# Patient Record
Sex: FEMALE | Race: WHITE | NOT HISPANIC OR LATINO | Employment: UNEMPLOYED | URBAN - NONMETROPOLITAN AREA
[De-identification: names, ages, dates, MRNs, and addresses within clinical notes are randomized per-mention and may not be internally consistent; named-entity substitution may affect disease eponyms.]

---

## 2017-09-28 ENCOUNTER — AMBULATORY - GICH (OUTPATIENT)
Dept: RADIOLOGY | Facility: OTHER | Age: 47
End: 2017-09-28

## 2017-09-28 DIAGNOSIS — G89.29 OTHER CHRONIC PAIN: ICD-10-CM

## 2017-09-28 DIAGNOSIS — R32 URINARY INCONTINENCE: ICD-10-CM

## 2017-09-28 DIAGNOSIS — M54.42 LOW BACK PAIN WITH LEFT-SIDED SCIATICA: ICD-10-CM

## 2017-09-28 DIAGNOSIS — M79.2 NEURALGIA AND NEURITIS, UNSPECIFIED (CODE): ICD-10-CM

## 2017-10-03 ENCOUNTER — HOSPITAL ENCOUNTER (OUTPATIENT)
Dept: RADIOLOGY | Facility: OTHER | Age: 47
End: 2017-10-03

## 2017-10-03 DIAGNOSIS — M79.2 NEURALGIA AND NEURITIS, UNSPECIFIED (CODE): ICD-10-CM

## 2017-10-03 DIAGNOSIS — G89.29 OTHER CHRONIC PAIN: ICD-10-CM

## 2017-10-03 DIAGNOSIS — M54.42 LOW BACK PAIN WITH LEFT-SIDED SCIATICA: ICD-10-CM

## 2017-10-03 DIAGNOSIS — R32 URINARY INCONTINENCE: ICD-10-CM

## 2017-10-11 ENCOUNTER — AMBULATORY - GICH (OUTPATIENT)
Dept: SCHEDULING | Facility: OTHER | Age: 47
End: 2017-10-11

## 2017-10-12 ENCOUNTER — AMBULATORY - GICH (OUTPATIENT)
Dept: ORTHOPEDICS | Facility: OTHER | Age: 47
End: 2017-10-12

## 2017-10-12 DIAGNOSIS — M25.531 PAIN IN RIGHT WRIST: ICD-10-CM

## 2017-10-12 DIAGNOSIS — M25.532 PAIN IN LEFT WRIST: ICD-10-CM

## 2017-10-16 ENCOUNTER — AMBULATORY - GICH (OUTPATIENT)
Dept: ORTHOPEDICS | Facility: OTHER | Age: 47
End: 2017-10-16

## 2017-10-16 ENCOUNTER — HISTORY (OUTPATIENT)
Dept: ORTHOPEDICS | Facility: OTHER | Age: 47
End: 2017-10-16

## 2017-10-16 ENCOUNTER — HOSPITAL ENCOUNTER (OUTPATIENT)
Dept: RADIOLOGY | Facility: OTHER | Age: 47
End: 2017-10-16
Attending: ORTHOPAEDIC SURGERY

## 2017-10-16 ENCOUNTER — OFFICE VISIT - GICH (OUTPATIENT)
Dept: ORTHOPEDICS | Facility: OTHER | Age: 47
End: 2017-10-16

## 2017-10-16 DIAGNOSIS — M25.531 PAIN IN RIGHT WRIST: ICD-10-CM

## 2017-10-16 DIAGNOSIS — M25.532 PAIN IN LEFT WRIST: ICD-10-CM

## 2017-10-16 DIAGNOSIS — G56.03 BILATERAL CARPAL TUNNEL SYNDROME: ICD-10-CM

## 2017-10-23 ENCOUNTER — OFFICE VISIT - GICH (OUTPATIENT)
Dept: FAMILY MEDICINE | Facility: OTHER | Age: 47
End: 2017-10-23

## 2017-10-23 ENCOUNTER — HISTORY (OUTPATIENT)
Dept: FAMILY MEDICINE | Facility: OTHER | Age: 47
End: 2017-10-23

## 2017-10-23 DIAGNOSIS — G56.03 BILATERAL CARPAL TUNNEL SYNDROME: ICD-10-CM

## 2017-10-23 DIAGNOSIS — H61.21 IMPACTED CERUMEN OF RIGHT EAR: ICD-10-CM

## 2017-10-23 DIAGNOSIS — E78.49 OTHER HYPERLIPIDEMIA: ICD-10-CM

## 2017-10-23 DIAGNOSIS — Z01.818 ENCOUNTER FOR OTHER PREPROCEDURAL EXAMINATION: ICD-10-CM

## 2017-10-23 LAB
ABSOLUTE BASOPHILS - HISTORICAL: 0.1 THOU/CU MM
ABSOLUTE EOSINOPHILS - HISTORICAL: 0.2 THOU/CU MM
ABSOLUTE IMMATURE GRANULOCYTES(METAS,MYELOS,PROS) - HISTORICAL: 0.1 THOU/CU MM
ABSOLUTE LYMPHOCYTES - HISTORICAL: 2.5 THOU/CU MM (ref 0.9–2.9)
ABSOLUTE MONOCYTES - HISTORICAL: 0.6 THOU/CU MM
ABSOLUTE NEUTROPHILS - HISTORICAL: 8.8 THOU/CU MM (ref 1.7–7)
ANION GAP - HISTORICAL: 8 (ref 5–18)
BASOPHILS # BLD AUTO: 0.9 %
BUN SERPL-MCNC: 11 MG/DL (ref 7–25)
BUN/CREAT RATIO - HISTORICAL: 12
CALCIUM SERPL-MCNC: 9.8 MG/DL (ref 8.6–10.3)
CHLORIDE SERPLBLD-SCNC: 103 MMOL/L (ref 98–107)
CO2 SERPL-SCNC: 23 MMOL/L (ref 21–31)
CREAT SERPL-MCNC: 0.9 MG/DL (ref 0.7–1.3)
EOSINOPHIL NFR BLD AUTO: 1.9 %
ERYTHROCYTE [DISTWIDTH] IN BLOOD BY AUTOMATED COUNT: 12.9 % (ref 11.5–15.5)
GFR IF NOT AFRICAN AMERICAN - HISTORICAL: >60 ML/MIN/1.73M2
GLUCOSE SERPL-MCNC: 83 MG/DL (ref 70–105)
HCT VFR BLD AUTO: 39.3 % (ref 33–51)
HEMOGLOBIN: 13.5 G/DL (ref 12–16)
IMMATURE GRANULOCYTES(METAS,MYELOS,PROS) - HISTORICAL: 0.5 %
LYMPHOCYTES NFR BLD AUTO: 20.3 % (ref 20–44)
MCH RBC QN AUTO: 30.3 PG (ref 26–34)
MCHC RBC AUTO-ENTMCNC: 34.4 G/DL (ref 32–36)
MCV RBC AUTO: 88 FL (ref 80–100)
MONOCYTES NFR BLD AUTO: 4.7 %
NEUTROPHILS NFR BLD AUTO: 71.7 % (ref 42–72)
PLATELET # BLD AUTO: 292 THOU/CU MM (ref 140–440)
PMV BLD: 9.4 FL (ref 6.5–11)
POTASSIUM SERPL-SCNC: 4 MMOL/L (ref 3.5–5.1)
RED BLOOD COUNT - HISTORICAL: 4.45 MIL/CU MM (ref 4–5.2)
SODIUM SERPL-SCNC: 134 MMOL/L (ref 133–143)
WHITE BLOOD COUNT - HISTORICAL: 12.3 THOU/CU MM (ref 4.5–11)

## 2017-10-31 ENCOUNTER — COMMUNICATION - GICH (OUTPATIENT)
Dept: CARDIAC REHAB | Facility: OTHER | Age: 47
End: 2017-10-31

## 2017-11-08 ENCOUNTER — HISTORY (OUTPATIENT)
Dept: SURGERY | Facility: OTHER | Age: 47
End: 2017-11-08

## 2017-11-08 ENCOUNTER — SURGERY (OUTPATIENT)
Dept: SURGERY | Facility: OTHER | Age: 47
End: 2017-11-08

## 2017-11-08 ENCOUNTER — HOSPITAL ENCOUNTER (OUTPATIENT)
Dept: SURGERY | Facility: OTHER | Age: 47
Discharge: HOME | End: 2017-11-08
Attending: ORTHOPAEDIC SURGERY | Admitting: ORTHOPAEDIC SURGERY

## 2017-11-08 DIAGNOSIS — Z98.890 OTHER SPECIFIED POSTPROCEDURAL STATES: ICD-10-CM

## 2017-11-09 ENCOUNTER — COMMUNICATION - GICH (OUTPATIENT)
Dept: ORTHOPEDICS | Facility: OTHER | Age: 47
End: 2017-11-09

## 2017-11-20 ENCOUNTER — HISTORY (OUTPATIENT)
Dept: ORTHOPEDICS | Facility: OTHER | Age: 47
End: 2017-11-20

## 2017-11-20 ENCOUNTER — OFFICE VISIT - GICH (OUTPATIENT)
Dept: ORTHOPEDICS | Facility: OTHER | Age: 47
End: 2017-11-20

## 2017-11-20 DIAGNOSIS — Z98.890 OTHER SPECIFIED POSTPROCEDURAL STATES: ICD-10-CM

## 2017-11-27 ENCOUNTER — OFFICE VISIT - GICH (OUTPATIENT)
Dept: FAMILY MEDICINE | Facility: OTHER | Age: 47
End: 2017-11-27

## 2017-11-27 ENCOUNTER — HISTORY (OUTPATIENT)
Dept: FAMILY MEDICINE | Facility: OTHER | Age: 47
End: 2017-11-27

## 2017-11-27 DIAGNOSIS — M79.7 FIBROMYALGIA: ICD-10-CM

## 2017-11-27 DIAGNOSIS — G56.03 BILATERAL CARPAL TUNNEL SYNDROME: ICD-10-CM

## 2017-11-27 DIAGNOSIS — E78.49 OTHER HYPERLIPIDEMIA: ICD-10-CM

## 2017-11-27 DIAGNOSIS — Z98.890 OTHER SPECIFIED POSTPROCEDURAL STATES: ICD-10-CM

## 2017-11-27 DIAGNOSIS — M54.50 LOW BACK PAIN: ICD-10-CM

## 2017-11-27 DIAGNOSIS — M62.838 OTHER MUSCLE SPASM: ICD-10-CM

## 2017-11-27 DIAGNOSIS — G89.29 OTHER CHRONIC PAIN: ICD-10-CM

## 2017-11-27 ASSESSMENT — PATIENT HEALTH QUESTIONNAIRE - PHQ9: SUM OF ALL RESPONSES TO PHQ QUESTIONS 1-9: 7

## 2017-11-28 ENCOUNTER — COMMUNICATION - GICH (OUTPATIENT)
Dept: FAMILY MEDICINE | Facility: OTHER | Age: 47
End: 2017-11-28

## 2017-11-29 ENCOUNTER — AMBULATORY - GICH (OUTPATIENT)
Dept: SCHEDULING | Facility: OTHER | Age: 47
End: 2017-11-29

## 2017-11-30 ENCOUNTER — AMBULATORY - GICH (OUTPATIENT)
Dept: SCHEDULING | Facility: OTHER | Age: 47
End: 2017-11-30

## 2017-12-01 LAB
6-MONOACETYL MORPHINE - HISTORICAL: ABNORMAL NG/ML
AMPHETAMINE URINE - HISTORICAL: ABNORMAL NG/ML
BARBITURATE URINE - HISTORICAL: ABNORMAL NG/ML
BENZODIAZEPINE URINE - HISTORICAL: ABNORMAL NG/ML
BUPRENORPHRINE URINE - HISTORICAL: ABNORMAL NG/ML
COCAINE METAB URINE - HISTORICAL: ABNORMAL NG/ML
CREAT UR - HISTORICAL: 120 MG/DL
ETHYLGLUCURONIDE URINE - HISTORICAL: ABNORMAL NG/ML
FENTANYL URINE - HISTORICAL: ABNORMAL NG/ML
MASS SPECTROMETRY URINE - HISTORICAL: ABNORMAL
METHADONE URINE - HISTORICAL: ABNORMAL NG/ML
OPIATES URINE - HISTORICAL: ABNORMAL NG/ML
OXYCODONE URINE - HISTORICAL: ABNORMAL NG/ML
PH URINE - HISTORICAL: 5.9
PROPOXYPHENE URINE - HISTORICAL: ABNORMAL NG/ML
THC 50 URINE - HISTORICAL: ABNORMAL NG/ML
TRAMADOL - HISTORICAL: ABNORMAL NG/ML

## 2017-12-26 ENCOUNTER — COMMUNICATION - GICH (OUTPATIENT)
Dept: FAMILY MEDICINE | Facility: OTHER | Age: 47
End: 2017-12-26

## 2017-12-26 DIAGNOSIS — M79.7 FIBROMYALGIA: ICD-10-CM

## 2017-12-27 ENCOUNTER — OFFICE VISIT - GICH (OUTPATIENT)
Dept: FAMILY MEDICINE | Facility: OTHER | Age: 47
End: 2017-12-27

## 2017-12-27 ENCOUNTER — HISTORY (OUTPATIENT)
Dept: FAMILY MEDICINE | Facility: OTHER | Age: 47
End: 2017-12-27

## 2017-12-27 DIAGNOSIS — E78.49 OTHER HYPERLIPIDEMIA: ICD-10-CM

## 2017-12-27 DIAGNOSIS — M54.50 LOW BACK PAIN: ICD-10-CM

## 2017-12-27 DIAGNOSIS — M19.90 OSTEOARTHRITIS: ICD-10-CM

## 2017-12-27 DIAGNOSIS — G89.29 OTHER CHRONIC PAIN: ICD-10-CM

## 2017-12-27 DIAGNOSIS — M79.7 FIBROMYALGIA: ICD-10-CM

## 2017-12-27 LAB
CHOL/HDL RATIO - HISTORICAL: 4.38
CHOLESTEROL TOTAL: 171 MG/DL
HDLC SERPL-MCNC: 39 MG/DL (ref 23–92)
LDLC SERPL CALC-MCNC: 54 MG/DL
NON-HDL CHOLESTEROL - HISTORICAL: 132 MG/DL
PROVIDER ORDERDED STATUS - HISTORICAL: ABNORMAL
TRIGL SERPL-MCNC: 388 MG/DL

## 2017-12-27 NOTE — PROGRESS NOTES
Patient Information     Patient Name MRFabiana Herbert [id number] Female 1970      Progress Notes by Gladys Jones NP at 10/23/2017 10:00 AM     Author:  Gladys Jones NP Service:  (none) Author Type:  PHYS- Nurse Practitioner     Filed:  10/23/2017 11:25 AM Encounter Date:  10/23/2017 Status:  Signed     :  Gladys Jones NP (PHYS- Nurse Practitioner)            See H&P. GLADYS JONES NP ....................  10/23/2017   10:08 AM

## 2017-12-27 NOTE — PROGRESS NOTES
Patient Information     Patient Name MRN Sex Fabiana Vieira [id number] Female 1970      Progress Notes by Brittany Ferrell at 10/3/2017 10:53 AM     Author:  Brittany Ferrell Service:  (none) Author Type:  Other Clinical Staff     Filed:  10/3/2017 10:53 AM Date of Service:  10/3/2017 10:53 AM Status:  Signed     :  Brittany Ferrell (Other Clinical Staff)            Falls Risk Criteria:    Age 65 and older or under age 4        Sensory deficits    Poor vision    Use of ambulatory aides    Impaired judgment    Unable to walk independently    Meets High Risk criteria for falls:  no

## 2017-12-28 NOTE — TELEPHONE ENCOUNTER
Patient Information     Patient Name MRN Fabiana Preston [id number] Female 1970      Telephone Encounter by Zaira Negron at 2017 11:51 AM     Author:  Zaira Negron Service:  (none) Author Type:  (none)     Filed:  2017 11:52 AM Encounter Date:  2017 Status:  Signed     :  Zaira Negron            TDE - Pt returned TDE's call to state that everything is going well.  If there are additional concerns please call pt otherwise she stated she is doing good.        Zaira EDWARDS ....................  2017   11:52 AM

## 2017-12-28 NOTE — OR ANESTHESIA
Patient Information     Patient Name MRN Sex     Fabiana Sanders [id number] Female 1970      OR Anesthesia by Brent Simpson DO at 2017  1:00 PM     Author:  Brent Simpson DO Service:  (none) Author Type:  PHYS- Anesthesiologist     Filed:  2017  1:00 PM Date of Service:  2017  1:00 PM Status:  Signed     :  Brent Simpson DO (PHYS- Anesthesiologist)            Anesthesia Post Operative Care Note    Name: Fabiana Sanders  MRN:   [id number]  :    1970       Procedure Done:  See Surgeon Note        Anesthesia Technique    Anesthetic Type:  General     Airway Management:  LMA     Oral Trauma:  No    Intraoperative Course   Hemodynamics:  Stable    Ventilation Normal:  Yes Lung Sounds:  Normal      PACU Course    Airway Status:  Extubated     Nondepolarizer Used:       Reversed: N/A   Hemodynamics:  Stable      Hydration: Euvolemic   Temperature:  36.1 - 38.3      Mental Status:  Awake, alert, follows commands   Pain Management:  Adequate   Regional Block:  No   Anesthesia Complications:  None      Vital Signs:  Temp: 97.6  F (36.4  C)  Pulse: (!) 111  BP: 123/82  Resp: 18  SpO2: 94 %    O2 Device: Room Air                  Active Lines:  Patient Lines/Drains/Airways Status    Active Line     None                Intake & Output:  Date  17 - 17 0659(Not Admitted)    17 - 17 0659(Discharged)      Shift  1598-2951 5083-8913 4937-5936 24 Hour Total 0564-0282 7911-8575 4316-1050 24 Hour Total   I  N  T  A  K  E   Oral/Enteral     240   240       +I/O+    Oral     240   240    Intravenous     850   850       +I/O+  Maint IV (lactated Ringers infusion)     850   850    Shift Total     1090   1090   O  U  T  P  U  T   Shift Total           NET      1090   1090   Weight (kg)                  Labs:  No results for input(s): YS6LJJDNQTZ, GBR6PZUOMSLK, PHARTERIAL, UFJ6KNYKSQWW, H0VYMIODLWON in the last 24 hours.    No results for input(s): MAGNESIUM in  the last 24 hours.    No results for input(s): GLUCOSEMETER in the last 720 hours.        Brent Simpson DO ....................  11/8/2017   1:00 PM

## 2017-12-28 NOTE — PROGRESS NOTES
Patient Information     Patient Name MRN Sex Fabiana Vieira [id number] Female 1970      Progress Notes by Radha Humphrey MD at 2017  8:15 AM     Author:  Radha Humphrey MD Service:  (none) Author Type:  Physician     Filed:  2017  4:48 PM Encounter Date:  2017 Status:  Signed     :  Radha Humphrey MD (Physician)            SUBJECTIVE:    Fabiana Sanders is a 47 y.o. female who presents to [facility].     HPI Comments: Patient was previously going to [facility].   She moved here from [place] about 5 years ago.   She has a previous diagnosis of fibromyalgia, chronic lower back pain and osteoarthritis.   She is currently on lortab and tramadol.  Her PCP recently moved and she was finding it hard to travel to [place] from  (previouly had lived in [place]).     Recently she underwent 4 weeks of physical therapy (in October) at [facility] and this was helpful.   Recent diagnosed of severe B Carpal tunnel, s/p R CTS release.     Her  has end stage COPD and she is the primary care taker.         Patient Active Problem List      Diagnosis Date Noted     Fibromyalgia 2017     Chronic midline low back pain without sciatica 2017     S/P right carpal tunnel release 2017     Other hyperlipidemia 10/23/2017     Bilateral carpal tunnel syndrome 10/16/2017       REVIEW OF SYSTEMS:  ROS see HPI, ROS is otherwise negative.     OBJECTIVE:  /66  Pulse 64  Wt 104.3 kg (230 lb)  BMI 39.48 kg/m2    EXAM:   Physical Exam   Constitutional: She is oriented to person, place, and time and well-developed, well-nourished, and in no distress.   Eyes: Conjunctivae are normal.   Cardiovascular: Normal rate.    Pulmonary/Chest: Effort normal.   Neurological: She is alert and oriented to person, place, and time.   Skin: No rash noted.   Psychiatric: Mood and affect normal.       ASSESSMENT/PLAN:    ICD-10-CM    1. Muscle spasm M62.838    2. Fibromyalgia M79.7 traZODone  (DESYREL) 50 mg tablet      cyclobenzaprine (FLEXERIL) 10 mg tablet      venlafaxine (EFFEXOR XR) 75 mg cp24 Extended-Release capsule      atorvastatin (LIPITOR) 20 mg tablet      DRUG SCREEN - PAIN MANAGEMENT - TOXASSURE      traMADol (ULTRAM) 50 mg tablet      AMB CONSULT TO PHYSICAL THERAPY      DRUG SCREEN - PAIN MANAGEMENT - TOXASSURE   3. Chronic midline low back pain without sciatica M54.5 AMB CONSULT TO PHYSICAL THERAPY     G89.29    4. S/P right carpal tunnel release Z98.890    5. Bilateral carpal tunnel syndrome G56.03    6. Other hyperlipidemia E78.4         Plan:    Patient Instructions   Will fill Tramadol 2 tabs 3 x/day.  Trial of water based therapy at [facility], consider ongoing water based exercise.   Will request records from [facility]  Continue with home exercise program.  Urine tox screen, per policy.  Follow up in 1 month to review records, review controlled substance contract and further discuss pain management.        Radha Humphrey MD

## 2017-12-28 NOTE — PROGRESS NOTES
Patient Information     Patient Name MRN Sex Fabiana Vieira [id number] Female 1970      Progress Notes by Getachew Das DO at 10/16/2017  8:45 AM     Author:  Getachew Das DO Service:  (none) Author Type:  Physician     Filed:  10/16/2017  9:15 AM Encounter Date:  10/16/2017 Status:  Signed     :  Getachew Das DO (Physician)            Fabiana Sanders was seen in consultation for Nayeli Olivia CNP for a chief complaint of numbness into both hands.      CHIEF COMPLAINT: Fabiana Sanders is a 47 y.o.  female  Chief Complaint     Patient presents with       Consult      Bilateral CTS       HISTORY OF PRESENTING INJURY   History of presenting injury, patient has had an ongoing complaint of numbness into both hands since . Her right is worse than her left. She denies any trauma but has noticed that she is getting more numbness. She's been wearing splints this tried anti-inflammatories and did do some therapy. There is been no resolution or symptoms. She comes in today to discuss options and has underwent an EMG.  Description of pain:  mild aching, discomfort and numbness   Radiation of pain: yes  Pain course: gradually worsening  Worse with: bending or flexing affected area, extending affected area and turning or rotating affected area  Improved by: OTC meds, Rx meds and rest  History of injection: No  Any PT: Yes    PAST MEDICAL HISTORY:  Past Medical History:     Diagnosis  Date     Bilateral carpal tunnel syndrome 10/16/2017       PAST SURGICAL HISTORY:  Hysterectomy, hernia repair.    ORTHOPEDIC FRACTURES AND BROKEN BONES:  No previous fractures.    ALLERGIES:  No Known Allergies    CURRENT MEDICATIONS:  Current Outpatient Prescriptions       Medication  Sig Dispense Refill     atorvastatin (LIPITOR) 20 mg tablet Take 20 mg by mouth once daily.       cyclobenzaprine (FLEXERIL) 10 mg tablet Take 1 tablet by mouth 3 times daily if needed for Muscle Spasm.        "diclofenac 1 % topical (VOLTAREN) gel Apply 4 g topically to affected area(s) 4 times daily.       meloxicam (MOBIC) 15 mg tablet 15 mg, 1 tab BID PRN       traMADol (ULTRAM) 50 mg tablet Take 2 tablets by mouth 2 times daily.       traZODone (DESYREL) 50 mg tablet 50 mg 1-2 tabs at HS       venlafaxine (EFFEXOR XR) 75 mg cp24 Extended-Release capsule Take 150 mg by mouth once daily with a meal.       No current facility-administered medications for this visit.      Medications have been reviewed by me and are current to the best of my knowledge and ability.      SOCIAL HISTORY:  Marital Status:   Children: Yes  Occupation: Presently not working. 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  Alcohol use:  No  Tobacco use: Smoker: yes  Are you or have you used illicit drugs:  no    FAMILY HISTORY:  Father passed away mom is still alive history of throat, lung and brain cancer and alcoholism.    REVIEW OF SYSTEMS:  The review of systems as documented in the HPI and on the intake questionnaire, completed by the patient on 10/16/2017 have been reviewed by myself and the pertinent positives and negatives addressed.  The remainder of the complete review of systems was non-contributory.    PHYSICAL EXAM:   /88  Pulse (!) 110  Ht 1.626 m (5' 4\")  Wt 96.6 kg (213 lb)  BMI 36.56 kg/m2 Body mass index is 36.56 kg/(m^2).    General Appearance: Pleasant female in good appearance, mood and affect.  Alert and orientated times three ( time, date and location).    Skin:Normal    Sensation is Abnormal in the median nerve distribution bilaterally.    Shoulder:  Motion: full    Elbow:  Flexion: Normal  Extension: Normal    Wrist:  Flexion: Normal  Extension: Normal  Radial/ulnar motion: Normal  Tenderness at the scaphoid: negative    Hand:  Thenar wasting: no  Hypothenar wasting: no  Sensation: Abnormal  Radial and ulnar blood flow:  Normal  Tinel's test positive  Phalen's test positive  Compression test positive    Eyes: Pupils are " round.    Ears: Hearing: Intact    Heart: Good capillary refill into both hands. Radial pulses regular.    Lungs: Coarse breath sounds.     Radiographic images from 10/16 where independently reviewed and discussed with the patient.      Xray:     X-rays demonstrate maintained joint spaces. Very minimal osteoarthritic changes appreciated. No fractures noted.    EMG:     Results reviewed with the patient they show delayed times in the median nerve bilaterally greater than left. Motor on the right is 5.1 the left is 4.6 ms sensory is 3.3 delayed time on the right and 3.0 on the left.    IMPRESSION:  Bilateral carpal tunnel syndrome    PLAN:  Risks, benefits, conservative, surgical and alternatives to treatment where discussed and the patient would like to proceed with operative intervention.  Patient like to do her right carpal tunnel first followed by the left.  She was given a handout and we did review the posterior board to help her better understand carpal tunnel symptoms and her pathology as well as anatomy she verbalized an understanding. Handout was given.  She will need preoperative clearance.  She will follow up after above.  Questions and concerns answered.    I have discussed options with Fabiana Sanders the treatment for carpal tunnel, which have included observation, physical therapy, corticosteroid injection versus surgical release. I discussed pros and cons of each approach, and at this point, Fabiana Sanders would like to proceed with carpal tunnel surgery. We discussed that surgery would be an outpatient surgery, you would be able to go home following the surgery. We will plan on open release of the transverse carpal ligament with anything else that needs to be done.  Surgical anesthesia would be general anesthesia versus block and anesthesia will discuss options.  I discussed following surgery Fabiana Sanders would be in a splint until seen in the office and they can work on gentle motion of the  elbow and fingers after surgery.  At four weeks following surgery occupational therapy may be needed.   Full recovery from carpal tunnel surgery may take a year. The goal will be pain relief. Complications were discussed including continued pain, stiffness in the wrist, rare chance of neurovascular damage, potential chance of infection. If deep infection were to occur, further surgery may be needed with repeat washout in the operating room with possible need for treatment with antibiotics.    Risks, benefits, conservative, surgical, and alternatives of treatment were thoroughly outlined. No guarantees were given. Risks which do include, but are not limited to:  Scar, infection, decreased motion, damage to blood vessels, nerves and tendons, failure or need for further treatment, reaction to medications and anesthesia, blood clots, and the possibility of death where discussed.  She did verbalize an understanding. All questions and concerns were addressed.    Patient is set up for open right carpal tunnel surgery    Fabiana Sanders will need pre op clearance for management of pulmonary function due to tobacco abuse.    Follow up after surgery will be 12-14 days    All questions where answered to the patients satisfaction.    Getachew Das D.O.  Orthopaedic Surgeon    [address] [address]  Phone [phone] ([phone])  Fax [phone]    This document was created using computer generated templates and voice activated software.    9:09 AM 10/16/2017

## 2017-12-28 NOTE — INTERVAL H&P NOTE
Patient Information     Patient Name MRN Sex Fabiana Vieira [id number] Female 1970      Interval H&P Note by Getachew Das DO at 2017  6:40 AM     Author:  Getachew Das DO Service:  (none) Author Type:  Physician     Filed:  2017  6:40 AM Date of Service:  2017  6:40 AM Status:  Signed     :  Getachew Das DO (Physician)            History and Physical Update    The history and physical has been reviewed and the patient's right wrist has been examined.  There are no interim changes to the patient's history or physical condition.  She is ready to proceed with planned procedure.  She understands the risks and benefits and once again these where outlined.    Getachew Das DO  Orthopedic Surgeon        Source Note     Author:  Gladys Jones NP Service:  (none) Author Type:  PHYS- Nurse Practitioner    Filed:  10/23/2017 11:25 AM Date of Service:  10/23/2017 10:00 AM Status:  Signed    :  Gladys Jones NP (PHYS- Nurse Practitioner)              ----------------- PREOPERATIVE EXAM ------------------  10/23/2017    SUBJECTIVE:  Fabiana Sanders is a 47 y.o. female here for preoperative optimization.    Nursing Notes:   Phylicia Jones  10/23/2017 10:09 AM  Signed  Date of Surgery: 17   Type of Surgery: Right carpel tunnel release  Surgeon: Dr. Das  Hospital:  [facility]  Fax: N/A    Fever/Chills or other infectious symptoms in past month: NO  >10lb weight loss in past two months: NO  O2 SAT: 100    Health Care Directive/Code status:  NO; CPR  Hx of blood transfusions:   (NO)   Td up to date:  YES  History of VRE/MRSA:  (NO) Date: NA    Preoperative Evaluation: Obstructive Sleep Apnea screening    S: Snore -  Do you snore loudly? (louder than talking or loud enough to be heard through closed doors)(NO)  T: Tired - Do you often feel tired, fatigued, or sleepy during the daytime?(YES)  O: Observed - Has anyone ever  "observed you stop breathing during your sleep?(NO)  P: Pressure - Do you have or are you being treated for high blood pressure?(YES)  B: BMI - BMI greater than 35kg/m2?(YES)  A: Age - Age over 50 years old?(NO)  N: Neck - Neck circumference greater than 40 cm?(YES)  G: Gender - Gender: Male?(NO)    Total number of \"YES\" responses:  4    Scoring: Low risk of VALENTIN 0-2  At Risk of VALENTIN: >3 High Risk of VALENTIN: 5-8    Phylicia Jones LPN...................  10/23/2017   10:01 AM        Phylicia Jones  10/23/2017 11:02 AM  Signed  The right ear canal was irrigated with body-temperature tap water with the jet of water directed superiorly.  The right ear canal was then re-examined and cleared of the impaction.  The patient tolerated the procedure well.  Phylicia Jones LPN..............10/23/2017 11:02 AM      I was asked to see Fabiana Sanders by Dr. Das for a preoperative optimization due to hyperlipidemia, chronic back pain and osteoarthritis. She had a pain contract with Dr Chavez in [place]. She was taking hydrocodone-APAP 10/325 mg but does not have the pain medications currently. She does not have a PCP currently.       Patient Active Problem List      Diagnosis Date Noted     Other hyperlipidemia 10/23/2017     Bilateral carpal tunnel syndrome 10/16/2017       Past Medical History:     Diagnosis  Date     Bilateral carpal tunnel syndrome 10/16/2017       Past Surgical History:      Procedure  Laterality Date     INGUINAL HERNIA REPAIR  2007     PARTIAL HYSTERECTOMY  2009    has both ovaries       TUBAL LIGATION  2001       Current Outpatient Prescriptions       Medication  Sig Dispense Refill     atorvastatin (LIPITOR) 20 mg tablet Take 20 mg by mouth once daily.       cyclobenzaprine (FLEXERIL) 10 mg tablet Take 1 tablet by mouth 3 times daily if needed for Muscle Spasm.       diclofenac 1 % topical (VOLTAREN) gel Apply 4 g topically to affected area(s) 4 times daily.       meloxicam (MOBIC) 15 mg tablet 15 mg, 1 tab " "BID PRN       traMADol (ULTRAM) 50 mg tablet Take 2 tablets by mouth 2 times daily.       traZODone (DESYREL) 50 mg tablet 50 mg 1-2 tabs at HS       venlafaxine (EFFEXOR XR) 75 mg cp24 Extended-Release capsule Take 150 mg by mouth once daily with a meal.       No current facility-administered medications for this visit.      Medications have been reviewed by me and are current to the best of my knowledge and ability.    Recent use of: NSAIDS    Allergies:  No Known Allergies    Latex allergy  no    Family History       Problem   Relation Age of Onset     Throat cancer  Father      Other  Father      brain tumor       Cancer  Maternal Grandmother      \"female organs\"       Stroke  Paternal Grandmother      Throat cancer  Paternal Grandfather        Denies family hx of bleeding tendencies, anesthesia complications, or other problems with surgery.      Social History      Substance Use Topics        Smoking status:  Former Smoker     Packs/day: 1.00     Smokeless tobacco:  Never Used     Alcohol use  No         ROS:    surgical:  patient denies previous complications from prior surgeries including but not limited to prolonged bleeding, anesthesia complications, dysrhythmias, surgical wound infections, or prolonged hospital stay.      REVIEW OF SYSTEMS:  A comprehensive review of systems was negative except for items noted in HPI/Subjective.    Constitutional: Negative for fever , chills  and fatigue .   Eyes: Negative for irritation  and redness .  Ears, nose, mouth, throat, and face: Negative for ear drainage , nasal congestion , sore throat and hoarseness .  Respiratory: Negative for cough, sputum production , hemoptysis, dyspnea  and wheezing .  Cardiovascular: Negative for chest discomfort, palpitations and lightheadedness .  Gastrointestinal: Negative for dysphagia , nausea , vomiting , melena , diarrhea , constipation  and abdominal pain.  Genitourinary:Negative for frequency, dysuria , urinary incontinence , " "hematuria.  Integument/breast: Negative for rash .   Hematologic/lymphatic: Negative for easy bruising , bleeding, lymphadenopathy  and petechiae.   Musculoskeletal: chronic back pain  Neurological: Negative for headaches, dizziness , vertigo , seizures  and weakness.   Psychiatric: Negative for anxiety , depression , panic attacks  and suicidal ideations .        -------------------------------------------------------------    PHYSICAL EXAM:  /82  Pulse 98  Temp 96.8  F (36  C) (Tympanic)  Resp 18  Ht 1.626 m (5' 4\")  Wt 99.5 kg (219 lb 6.4 oz)  SpO2 100%  BMI 37.66 kg/m2    EXAM:  General Appearance: Pleasant, alert, appropriate appearance for age. No acute distress  Head Exam: Normal. Normocephalic, atraumatic.  Eye Exam: Normal external eye, conjunctiva, lids, cornea. LUISANA.  Ear Exam: Normal TM's bilaterally. Normal auditory canals and external ears. Non-tender.  Nose Exam: Normal external nose, mucus membranes, and septum.  OroPharynx Exam: Dental hygiene adequate. Normal buccal mucosa. Normal pharynx.  Neck Exam: Supple, no masses or nodes., no lymphadenopathy  Thyroid Exam: Normal., No nodules or enlargement., normal to palpation  Chest/Respiratory Exam: Normal chest wall and respirations. Clear to auscultation.  Cardiovascular Exam: Regular rate and rhythm. S1, S2, no murmur, click, gallop, or rubs.  Gastrointestinal Exam: Soft, nontender, no abnormal masses or organomegaly. BS x 4.  Lymphatic Exam: Normal.  Musculoskeletal Exam: Back is straight and non-tender, full ROM of upper and lower extremities.  Skin: no rash or abnormalities  Neurologic Exam:symmetric DTRs, normal gross motor movement, tone, and coordination. No tremor.  Psychiatric Exam: Alert and oriented, appropriate affect.    PHQ Depression Screen  Date of PHQ exam: 10/23/17  Over the last 2 weeks, how often have you been bothered by any of the following problems?  1. Little interest or pleasure in doing things: 2 - More than half " the days  2. Feeling down, depressed, or hopeless: 1 - Several days  3. Trouble falling or staying asleep, or sleeping too much: 1 - Several days  4. Feeling tired or having little energy: 1 - Several days  5. Poor appetite or overeatin - Several days  6. Feeling bad about yourself - or that you are a failure or have let yourself or your family down: 1 - Several days  7. Trouble concentrating on things, such as reading the newspaper or watching television: 0 - Not at all  8. Moving or speaking so slowly that other people could have noticed. Or the opposite - being so fidgety or restless that you have been moving around a lot more than usual: 0 - Not at all  9. Thoughts that you would be better off dead, or of hurting yourself in some way: 0 - Not at all    PHQ-9 TOTAL SCORE: 7  Depression Severity Level: mild       Patient can walk up a flight of stairs without becoming short of breath or having chest pain: YES   Patient is able to tolerate greater than 4 METs of activity without any cardiopulmonary symptoms.    LABS:    Results for orders placed or performed in visit on 10/23/17       BASIC METABOLIC PANEL       Result  Value Ref Range Status    SODIUM 134 133 - 143 mmol/L Final    POTASSIUM 4.0 3.5 - 5.1 mmol/L Final    CHLORIDE 103 98 - 107 mmol/L Final    CO2,TOTAL 23 21 - 31 mmol/L Final    ANION GAP 8 5 - 18                 Final    GLUCOSE 83 70 - 105 mg/dL Final    CALCIUM 9.8 8.6 - 10.3 mg/dL Final    BUN 11 7 - 25 mg/dL Final    CREATININE 0.90 0.70 - 1.30 mg/dL Final    BUN/CREAT RATIO           12                 Final    GFR if African American >60 >60 ml/min/1.73m2 Final    GFR if not African American >60 >60 ml/min/1.73m2 Final   CBC WITH AUTO DIFFERENTIAL       Result  Value Ref Range Status    WHITE BLOOD COUNT         12.3 (H) 4.5 - 11.0 thou/cu mm Final    RED BLOOD COUNT           4.45 4.00 - 5.20 mil/cu mm Final    HEMOGLOBIN                13.5 12.0 - 16.0 g/dL Final    HEMATOCRIT                 39.3 33.0 - 51.0 % Final    MCV                       88 80 - 100 fL Final    MCH                       30.3 26.0 - 34.0 pg Final    MCHC                      34.4 32.0 - 36.0 g/dL Final    RDW                       12.9 11.5 - 15.5 % Final    PLATELET COUNT            292 140 - 440 thou/cu mm Final    MPV                       9.4 6.5 - 11.0 fL Final    NEUTROPHILS               71.7 42.0 - 72.0 % Final    LYMPHOCYTES               20.3 20.0 - 44.0 % Final    MONOCYTES                 4.7 <12.0 % Final    EOSINOPHILS               1.9 <8.0 % Final    BASOPHILS                 0.9 <3.0 % Final    IMMATURE GRANULOCYTES(METAS,MYELOS,PROS) 0.5 % Final    ABSOLUTE NEUTROPHILS      8.8 (H) 1.7 - 7.0 thou/cu mm Final    ABSOLUTE LYMPHOCYTES      2.5 0.9 - 2.9 thou/cu mm Final    ABSOLUTE MONOCYTES        0.6 <0.9 thou/cu mm Final    ABSOLUTE EOSINOPHILS      0.2 <0.5 thou/cu mm Final    ABSOLUTE BASOPHILS        0.1 <0.3 thou/cu mm Final    ABSOLUTE IMMATURE GRANULOCYTES(METAS,MYELOS,PROS) 0.1 <=0.3 thou/cu mm Final         CXR:  n/a    EKG: n/a, reports EKG 1/2017 in [place] was normal  ---------------------------------------------------------------      No family history of problems with bleeding or anesthetia.    ASA PS class II. Patient's perioperative risk is minimized and no further cardiopulmonary workup is neccesary.  Please contact the office with any questions or concerns.      ICD-10-CM    1. Preop examination Z01.818 CBC WITH DIFFERENTIAL      BASIC METABOLIC PANEL      CBC WITH DIFFERENTIAL      BASIC METABOLIC PANEL      CBC WITH AUTO DIFFERENTIAL   2. Other hyperlipidemia E78.4    3. Bilateral carpal tunnel syndrome G56.03    4. Impacted cerumen of right ear H61.21 EAR WAX REMOVAL       ASSESSEMNT AND PLAN:  1.  Preoperative history and physical   consults:  None  Patient is approved for the surgery.  No concerns.     For above listed surgery and anesthesia:     - Patient is low  risk for perioperative  complications.     Patient was administered the following vaccines today: declines influenza vaccine, will do at pharmacy.  Completed labs today: CBC, no concerns.    PRE OP RECOMMENDATIONS:  Patient is on chronic pain medications (YES)   Patient is on antiplatlet/anticoagulation (NO)  Other medications that need adjustment perioperatively (NO)     There are no Patient Instructions on file for this visit.    Other:  Patient was advised to call our office and the surgical services with any change in condition or new symptoms if they were to develop between today and their surgical date.  Especially any cardiopulmonary symptoms or symptoms concerning for an infection.     PRE OP RECOMMENDATIONS:  Discontinue ASA 7 days prior to reduce bleeding risk and Discontinue NSAIDS 7 days prior to procedure to reduce bleeding risk    Greater than 25 minutes were spent in counseling and coordination of care.    ABA RUBIO NP..................10/23/2017 10:08 AM

## 2017-12-28 NOTE — PROGRESS NOTES
"Patient Information     Patient Name MRN Sex Fabiana Vieira [id number] Female 1970      Progress Notes by Getachew Das DO at 2017  8:15 AM     Author:  Getachew Das DO Service:  (none) Author Type:  Physician     Filed:  2017  8:34 AM Encounter Date:  2017 Status:  Signed     :  Getachew Das DO (Physician)            PROGRESS NOTE    SUBJECTIVE:  Faibana Sanders is here for evaluation in regards to right carpal tunnel release. Patient states she is doing very well feels like she is getting better feeling all the time. The left is been Trosper did flare little bit after surgery.     OBJECTIVE:  /80  Pulse 64  Temp 98.2  F (36.8  C) (Tympanic)   Ht 1.626 m (5' 4\")  Wt 99.3 kg (219 lb)  BMI 37.59 kg/m2 Body mass index is 37.59 kg/(m^2).    General Appearance: Pleasant female in good appearance, mood and affect.  Alert and orientated times three ( time, date and location).    Incision:  Clean and dry, closed, no infection    Wrist:  negative palpable pain  Motion: full    Shoulder:  Motion: full    Elbow:  Flexion: Normal  Extension: Normal  Deep tendon reflexes: Normal    Hand:  Sensation: Abnormal, there has been improvement in the median nerve distribution on the right. The left side still has decreased sensation in the median nerve distribution.  Positive Tinel's, compression and Phalen's testing on the left.  Radial and ulnar blood flow:  Normal    Eyes: Pupils are round.    Ears: Hearing: Intact    Heart: Good capillary refill into her hands pulses are regular.    Lungs: Clear.    ASSESSMENT:     POSTOPERATIVE DIAGNOSIS:  Bilateral carpal tunnel syndrome, right greater than left.     PROCEDURE:    1.  Release of the right transverse carpal ligament (DOS 2017).  2.  Left carpal tunnel syndrome.     PLAN:    Suture removal and steri strip.  Will get a wrist splint to wear for next 2 weeks and can remove to shower, then ok to " leave off for normal activities.  Patient knows to call the office if her other hamsters bothering her more. She needed to come in and we could set up her other surgery.  She has no more restrictions after 2 weeks.  She was instructed on wound scar remodeling which she can start in 1 week.  Questions and concerns answered.    Getachew Das D.O.  Orthopaedic Surgeon    [facility]  [address] [place] [id number]  Phone [phone] (KNEE)  Fax [phone]    This document was created using computer generated templates and voice activated software.    8:32 AM 11/20/2017

## 2017-12-28 NOTE — PATIENT INSTRUCTIONS
Patient Information     Patient Name MRN Sex Fabiana Vieira [id number] Female 1970      Patient Instructions by Radha Humphrey MD at 2017  8:15 AM     Author:  Radha Humphrey MD Service:  (none) Author Type:  Physician     Filed:  2017  9:07 AM Encounter Date:  2017 Status:  Signed     :  Radha Humphrey MD (Physician)            Will fill Tramadol 2 tabs 3 x/day.  Trial of water based therapy at [facility], consider ongoing water based exercise.   Will request records from [place] and [place]  Continue with home exercise program.  Urine tox screen, per policy.  Follow up in 1 month to review records, review controlled substance contract and further discuss pain management.

## 2017-12-28 NOTE — OR POSTOP
Patient Information     Patient Name MRN Sex Fabiana Vieira [id number] Female 1970      OR PostOp by Rebecca Recinos RN at 2017  9:30 AM     Author:  Rebecca Recinos RN Service:  (none) Author Type:  NURS- Registered Nurse     Filed:  2017  9:31 AM Date of Service:  2017  9:30 AM Status:  Signed     :  Rebecca Recinos RN (NURS- Registered Nurse)            Discharge Note    Data:  Fabiana Sanders has been discharged home at 0930   via wheelchair accompanied by Registered Nurse.      Action:  Written discharge/follow-up instructions were provided to patient. Prescriptions were filled and sent with patient.  Belongings sent with patient. Medications from home sent with patient/family: Not Applicable  Equipment none .     Response:  Patient verbalized understanding of discharge instructions, reason for discharge, and necessary follow-up appointments.

## 2017-12-28 NOTE — OR ANESTHESIA
"Patient Information     Patient Name MRN Sex Fabiana Vieira [id number] Female 1970      OR Anesthesia by Brent Simpson DO at 2017  7:11 AM     Author:  Brent Simpson DO Service:  (none) Author Type:  PHYS- Anesthesiologist     Filed:  2017  7:11 AM Date of Service:  2017  7:11 AM Status:  Signed     :  Brent Simpson DO (PHYS- Anesthesiologist)            ANESTHESIAPREOP    PREANESTHETIC EXAM    Fabiana Sanders is a 47 y.o. female    /69  Pulse (!) 105  Temp 98  F (36.7  C)  Resp 18  SpO2 96%  Breastfeeding? No  There is no height or weight on file to calculate BMI.    ALLERGIES    Review of patient's allergies indicates no known allergies.    PAST MEDICAL HISTORY    Past Medical History:     Diagnosis  Date     Bilateral carpal tunnel syndrome 10/16/2017     S/P right carpal tunnel release 2017       Patient Active Problem List     Diagnosis  Code     Bilateral carpal tunnel syndrome G56.03     Other hyperlipidemia E78.4     S/P right carpal tunnel release Z98.890       Family History       Problem   Relation Age of Onset     Throat cancer  Father      Other  Father      brain tumor       Cancer  Maternal Grandmother      \"female organs\"       Stroke  Paternal Grandmother      Throat cancer  Paternal Grandfather        Past Surgical History:      Procedure  Laterality Date     INGUINAL HERNIA REPAIR       PARTIAL HYSTERECTOMY  2009    has both ovaries       S/P RIGHT CARPAL TUNNEL RELEASE Right 2017     TUBAL LIGATION         Major Anesthetic Reactions: none    PMH/PSH Reviewed    History     Smoking Status       Former Smoker      Packs/day: 1.00     Quit date: 2016   Smokeless Tobacco       Never Used      History    Alcohol Use No       Medications have been reviewed in coordination with proposed intra-procedure medications.    Prescriptions Prior to Admission       Medication  Sig Dispense Refill     acetaminophen (TYLENOL EXTRA " STRGTH) 500 mg tablet Take 1,000 mg by mouth every 6 hours if needed. Max acetaminophen dose: 4000mg in 24 hrs.       atorvastatin (LIPITOR) 20 mg tablet Take 20 mg by mouth once daily.       cyclobenzaprine (FLEXERIL) 10 mg tablet Take 1 tablet by mouth 3 times daily if needed for Muscle Spasm.       HYDROcodone-acetaminophen, 5-325 mg, (NORCO) per tablet        meloxicam (MOBIC) 15 mg tablet 15 mg, 1 tab BID PRN       traMADol (ULTRAM) 50 mg tablet Take 2 tablets by mouth 2 times daily.       traZODone (DESYREL) 50 mg tablet 50 mg 1-2 tabs at HS       venlafaxine (EFFEXOR XR) 75 mg cp24 Extended-Release capsule Take 75 mg by mouth 2 times daily.         Recent Labs  No results found for this visit on 11/08/17.    NPO Status Noted:  Yes    Airway Class:  2    ASA Physical Status: 2    Anesthetic Plan: GA/ LMA    The risks, benefits, and alternatives of the procedure were discussed.    PHYSICIAN ELECTRONIC SIGNATURE  Alex Simpson DO

## 2017-12-28 NOTE — OR POSTOP
Patient Information     Patient Name MRN Sex Fabiana Vieira [id number] Female 1970      OR PostOp by Rebecca Recinos RN at 2017  8:31 AM     Author:  Rebecca Recinos RN Service:  (none) Author Type:  NURS- Registered Nurse     Filed:  2017  8:32 AM Date of Service:  2017  8:31 AM Status:  Signed     :  Rebecca Recinos RN (NURS- Registered Nurse)            PACU Transfer Note    Fabiana Sanders transferred to DSU via cart.  Equipment used for transport:  None.  Accompanied by:  Registered Nurse. Report given to DSU RN    Patient stable and meets phase 1 discharge criteria for transport from PACU.    PACU Respiratory Event Documentation     1) Episodes of Apnea greater than or equal to 10 seconds: no    2) Bradypnea - less than 8 breaths per minute: no    3) Pain score on 0 to 10 scale: 0    4) Pain-sedation mismatch (yes or no): no    5) Repeated 02 desaturation less than 90% (yes or no): no    Anesthesia notified? (yes or no): no    Any of the above events occuring repeatedly in separate 30 minute intervals may be considered recurrent PACU respiratory events.

## 2017-12-28 NOTE — PROCEDURES
Patient Information     Patient Name MRN Sex Fabiana Vieira [id number] Female 1970      Procedures by Getachew Das DO at 2017  8:04 AM     Author:  Getachew Das DO Service:  (none) Author Type:  Physician     Filed:  2017  8:04 AM Date of Service:  2017  8:04 AM Status:  Signed     :  Getachew Das DO (Physician)            POSTOPERATIVE / POSTPROCEDURE NOTE - IMMEDIATE :    Surgeon(s)/Proceduralist(s) and Assistants (if any):  Surgeon(s):  Getachew Das DO    Procedure(s):  Right CTR    Procedure(s) findings:   See op note    Specimen(s) removed: no    (EBL) Estimated blood loss (ml): 5    Postoperative/Postprocedure Diagnosis:   See op note    Getachew Das D.O.  Orthopaedic Surgeon    [address] [place] [address]  Phone [phone] (KNEE)  Fax [phone]    8:04 AM 2017

## 2017-12-28 NOTE — TELEPHONE ENCOUNTER
Patient Information     Patient Name MRN Sex Fabiana Vieira [id number] Female 1970      Telephone Encounter by Lauren Proctor at 2017 12:13 PM     Author:  Lauren Proctor Service:  (none) Author Type:  (none)     Filed:  2017 12:13 PM Encounter Date:  2017 Status:  Signed     :  Lauren Proctor            Noted.  Lauren Proctor LPN .......2017 12:13 PM

## 2017-12-28 NOTE — H&P
"Patient Information     Patient Name MRN Sex Fabiana Vieira [id number] Female 1970      H&P (View-Only) by Gladys Jones NP at 10/23/2017 10:00 AM     Author:  Gladys Jones NP Service:  (none) Author Type:  PHYS- Nurse Practitioner     Filed:  10/23/2017 11:25 AM Date of Service:  10/23/2017 10:00 AM Status:  Signed     :  Gladys Jones NP (PHYS- Nurse Practitioner)            ----------------- PREOPERATIVE EXAM ------------------  10/23/2017    SUBJECTIVE:  Fabiana Sanders is a 47 y.o. female here for preoperative optimization.    Nursing Notes:   Phylicia Jones  10/23/2017 10:09 AM  Signed  Date of Surgery: 17   Type of Surgery: Right carpel tunnel release  Surgeon: Dr. Das  Hospital:  [facility]  Fax: N/A    Fever/Chills or other infectious symptoms in past month: NO  >10lb weight loss in past two months: NO  O2 SAT: 100    Health Care Directive/Code status:  NO; CPR  Hx of blood transfusions:   (NO)   Td up to date:  YES  History of VRE/MRSA:  (NO) Date: NA    Preoperative Evaluation: Obstructive Sleep Apnea screening    S: Snore -  Do you snore loudly? (louder than talking or loud enough to be heard through closed doors)(NO)  T: Tired - Do you often feel tired, fatigued, or sleepy during the daytime?(YES)  O: Observed - Has anyone ever observed you stop breathing during your sleep?(NO)  P: Pressure - Do you have or are you being treated for high blood pressure?(YES)  B: BMI - BMI greater than 35kg/m2?(YES)  A: Age - Age over 50 years old?(NO)  N: Neck - Neck circumference greater than 40 cm?(YES)  G: Gender - Gender: Male?(NO)    Total number of \"YES\" responses:  4    Scoring: Low risk of VALENTIN 0-2  At Risk of VALENTIN: >3 High Risk of VALENTIN: 5-8    Phylicia Jones LPN...................  10/23/2017   10:01 AM        Phylicia Jones  10/23/2017 11:02 AM  Signed  The right ear canal was irrigated with body-temperature tap water with the jet of water directed " superiorly.  The right ear canal was then re-examined and cleared of the impaction.  The patient tolerated the procedure well.  Phylicia Jones LPN..............10/23/2017 11:02 AM      I was asked to see Fabiana Sanders by Dr. Das for a preoperative optimization due to hyperlipidemia, chronic back pain and osteoarthritis. She had a pain contract with Dr Chavez in [place]. She was taking hydrocodone-APAP 10/325 mg but does not have the pain medications currently. She does not have a PCP currently.       Patient Active Problem List      Diagnosis Date Noted     Other hyperlipidemia 10/23/2017     Bilateral carpal tunnel syndrome 10/16/2017       Past Medical History:     Diagnosis  Date     Bilateral carpal tunnel syndrome 10/16/2017       Past Surgical History:      Procedure  Laterality Date     INGUINAL HERNIA REPAIR  2007     PARTIAL HYSTERECTOMY  2009    has both ovaries       TUBAL LIGATION  2001       Current Outpatient Prescriptions       Medication  Sig Dispense Refill     atorvastatin (LIPITOR) 20 mg tablet Take 20 mg by mouth once daily.       cyclobenzaprine (FLEXERIL) 10 mg tablet Take 1 tablet by mouth 3 times daily if needed for Muscle Spasm.       diclofenac 1 % topical (VOLTAREN) gel Apply 4 g topically to affected area(s) 4 times daily.       meloxicam (MOBIC) 15 mg tablet 15 mg, 1 tab BID PRN       traMADol (ULTRAM) 50 mg tablet Take 2 tablets by mouth 2 times daily.       traZODone (DESYREL) 50 mg tablet 50 mg 1-2 tabs at HS       venlafaxine (EFFEXOR XR) 75 mg cp24 Extended-Release capsule Take 150 mg by mouth once daily with a meal.       No current facility-administered medications for this visit.      Medications have been reviewed by me and are current to the best of my knowledge and ability.    Recent use of: NSAIDS    Allergies:  No Known Allergies    Latex allergy  no    Family History       Problem   Relation Age of Onset     Throat cancer  Father      Other  Father      brain tumor    "    Cancer  Maternal Grandmother      \"female organs\"       Stroke  Paternal Grandmother      Throat cancer  Paternal Grandfather        Denies family hx of bleeding tendencies, anesthesia complications, or other problems with surgery.      Social History      Substance Use Topics        Smoking status:  Former Smoker     Packs/day: 1.00     Smokeless tobacco:  Never Used     Alcohol use  No         ROS:    surgical:  patient denies previous complications from prior surgeries including but not limited to prolonged bleeding, anesthesia complications, dysrhythmias, surgical wound infections, or prolonged hospital stay.      REVIEW OF SYSTEMS:  A comprehensive review of systems was negative except for items noted in HPI/Subjective.    Constitutional: Negative for fever , chills  and fatigue .   Eyes: Negative for irritation  and redness .  Ears, nose, mouth, throat, and face: Negative for ear drainage , nasal congestion , sore throat and hoarseness .  Respiratory: Negative for cough, sputum production , hemoptysis, dyspnea  and wheezing .  Cardiovascular: Negative for chest discomfort, palpitations and lightheadedness .  Gastrointestinal: Negative for dysphagia , nausea , vomiting , melena , diarrhea , constipation  and abdominal pain.  Genitourinary:Negative for frequency, dysuria , urinary incontinence , hematuria.  Integument/breast: Negative for rash .   Hematologic/lymphatic: Negative for easy bruising , bleeding, lymphadenopathy  and petechiae.   Musculoskeletal: chronic back pain  Neurological: Negative for headaches, dizziness , vertigo , seizures  and weakness.   Psychiatric: Negative for anxiety , depression , panic attacks  and suicidal ideations .        -------------------------------------------------------------    PHYSICAL EXAM:  /82  Pulse 98  Temp 96.8  F (36  C) (Tympanic)  Resp 18  Ht 1.626 m (5' 4\")  Wt 99.5 kg (219 lb 6.4 oz)  SpO2 100%  BMI 37.66 kg/m2    EXAM:  General Appearance: " Pleasant, alert, appropriate appearance for age. No acute distress  Head Exam: Normal. Normocephalic, atraumatic.  Eye Exam: Normal external eye, conjunctiva, lids, cornea. LUISANA.  Ear Exam: Normal TM's bilaterally. Normal auditory canals and external ears. Non-tender.  Nose Exam: Normal external nose, mucus membranes, and septum.  OroPharynx Exam: Dental hygiene adequate. Normal buccal mucosa. Normal pharynx.  Neck Exam: Supple, no masses or nodes., no lymphadenopathy  Thyroid Exam: Normal., No nodules or enlargement., normal to palpation  Chest/Respiratory Exam: Normal chest wall and respirations. Clear to auscultation.  Cardiovascular Exam: Regular rate and rhythm. S1, S2, no murmur, click, gallop, or rubs.  Gastrointestinal Exam: Soft, nontender, no abnormal masses or organomegaly. BS x 4.  Lymphatic Exam: Normal.  Musculoskeletal Exam: Back is straight and non-tender, full ROM of upper and lower extremities.  Skin: no rash or abnormalities  Neurologic Exam:symmetric DTRs, normal gross motor movement, tone, and coordination. No tremor.  Psychiatric Exam: Alert and oriented, appropriate affect.    PHQ Depression Screen  Date of PHQ exam: 10/23/17  Over the last 2 weeks, how often have you been bothered by any of the following problems?  1. Little interest or pleasure in doing things: 2 - More than half the days  2. Feeling down, depressed, or hopeless: 1 - Several days  3. Trouble falling or staying asleep, or sleeping too much: 1 - Several days  4. Feeling tired or having little energy: 1 - Several days  5. Poor appetite or overeatin - Several days  6. Feeling bad about yourself - or that you are a failure or have let yourself or your family down: 1 - Several days  7. Trouble concentrating on things, such as reading the newspaper or watching television: 0 - Not at all  8. Moving or speaking so slowly that other people could have noticed. Or the opposite - being so fidgety or restless that you have been moving  around a lot more than usual: 0 - Not at all  9. Thoughts that you would be better off dead, or of hurting yourself in some way: 0 - Not at all    PHQ-9 TOTAL SCORE: 7  Depression Severity Level: mild       Patient can walk up a flight of stairs without becoming short of breath or having chest pain: YES   Patient is able to tolerate greater than 4 METs of activity without any cardiopulmonary symptoms.    LABS:    Results for orders placed or performed in visit on 10/23/17       BASIC METABOLIC PANEL       Result  Value Ref Range Status    SODIUM 134 133 - 143 mmol/L Final    POTASSIUM 4.0 3.5 - 5.1 mmol/L Final    CHLORIDE 103 98 - 107 mmol/L Final    CO2,TOTAL 23 21 - 31 mmol/L Final    ANION GAP 8 5 - 18                 Final    GLUCOSE 83 70 - 105 mg/dL Final    CALCIUM 9.8 8.6 - 10.3 mg/dL Final    BUN 11 7 - 25 mg/dL Final    CREATININE 0.90 0.70 - 1.30 mg/dL Final    BUN/CREAT RATIO           12                 Final    GFR if African American >60 >60 ml/min/1.73m2 Final    GFR if not African American >60 >60 ml/min/1.73m2 Final   CBC WITH AUTO DIFFERENTIAL       Result  Value Ref Range Status    WHITE BLOOD COUNT         12.3 (H) 4.5 - 11.0 thou/cu mm Final    RED BLOOD COUNT           4.45 4.00 - 5.20 mil/cu mm Final    HEMOGLOBIN                13.5 12.0 - 16.0 g/dL Final    HEMATOCRIT                39.3 33.0 - 51.0 % Final    MCV                       88 80 - 100 fL Final    MCH                       30.3 26.0 - 34.0 pg Final    MCHC                      34.4 32.0 - 36.0 g/dL Final    RDW                       12.9 11.5 - 15.5 % Final    PLATELET COUNT            292 140 - 440 thou/cu mm Final    MPV                       9.4 6.5 - 11.0 fL Final    NEUTROPHILS               71.7 42.0 - 72.0 % Final    LYMPHOCYTES               20.3 20.0 - 44.0 % Final    MONOCYTES                 4.7 <12.0 % Final    EOSINOPHILS               1.9 <8.0 % Final    BASOPHILS                 0.9 <3.0 % Final    IMMATURE  GRANULOCYTES(METAS,MYELOS,PROS) 0.5 % Final    ABSOLUTE NEUTROPHILS      8.8 (H) 1.7 - 7.0 thou/cu mm Final    ABSOLUTE LYMPHOCYTES      2.5 0.9 - 2.9 thou/cu mm Final    ABSOLUTE MONOCYTES        0.6 <0.9 thou/cu mm Final    ABSOLUTE EOSINOPHILS      0.2 <0.5 thou/cu mm Final    ABSOLUTE BASOPHILS        0.1 <0.3 thou/cu mm Final    ABSOLUTE IMMATURE GRANULOCYTES(METAS,MYELOS,PROS) 0.1 <=0.3 thou/cu mm Final         CXR:  n/a    EKG: n/a, reports EKG 1/2017 in [place] was normal  ---------------------------------------------------------------      No family history of problems with bleeding or anesthetia.    ASA PS class II. Patient's perioperative risk is minimized and no further cardiopulmonary workup is neccesary.  Please contact the office with any questions or concerns.      ICD-10-CM    1. Preop examination Z01.818 CBC WITH DIFFERENTIAL      BASIC METABOLIC PANEL      CBC WITH DIFFERENTIAL      BASIC METABOLIC PANEL      CBC WITH AUTO DIFFERENTIAL   2. Other hyperlipidemia E78.4    3. Bilateral carpal tunnel syndrome G56.03    4. Impacted cerumen of right ear H61.21 EAR WAX REMOVAL       ASSESSEMNT AND PLAN:  1.  Preoperative history and physical   consults:  None  Patient is approved for the surgery.  No concerns.     For above listed surgery and anesthesia:     - Patient is low  risk for perioperative complications.     Patient was administered the following vaccines today: declines influenza vaccine, will do at pharmacy.  Completed labs today: CBC, no concerns.    PRE OP RECOMMENDATIONS:  Patient is on chronic pain medications (YES)   Patient is on antiplatlet/anticoagulation (NO)  Other medications that need adjustment perioperatively (NO)     There are no Patient Instructions on file for this visit.    Other:  Patient was advised to call our office and the surgical services with any change in condition or new symptoms if they were to develop between today and their surgical date.  Especially any  cardiopulmonary symptoms or symptoms concerning for an infection.     PRE OP RECOMMENDATIONS:  Discontinue ASA 7 days prior to reduce bleeding risk and Discontinue NSAIDS 7 days prior to procedure to reduce bleeding risk    Greater than 25 minutes were spent in counseling and coordination of care.    ABA RUBIO NP..................10/23/2017 10:08 AM

## 2017-12-28 NOTE — PROCEDURES
Patient Information     Patient Name MRN Sex Fabiana Vieira [id number] Female 1970      Procedures signed by Getachew Das DO at 2017 10:17 AM      Author:  Getachew Das DO Service:  (none) Author Type:  Physician     Filed:  2017 10:17 AM Creation Time:  2017  8:36 AM Status:  Signed     :  Getachew Das DO (Physician)            DATE OF SERVICE:  2017    SURGEON:  Getachew Das DO.    ASSISTANT:  None.    PREOPERATIVE DIAGNOSIS:    Bilateral carpal tunnel syndrome, right greater than left.    POSTOPERATIVE DIAGNOSIS:  Bilateral carpal tunnel syndrome, right greater than left.    PROCEDURE:    1.  Release of the right transverse carpal ligament (DOS 2017).  2.  Left carpal tunnel syndrome.     IMPLANTS:    None.    ANESTHESIA:  General via LMA.    ESTIMATED BLOOD LOSS:    Less than 5.    FLUIDS:  See chart.    DRAINS:    None.    COMPLICATIONS:    None.    DISPOSITION:    Stable to postop.     INDICATIONS FOR PROCEDURE:    The patient is a 47-year-old female who had been having ongoing complaints of numbness and tingling into both hands.  She had tried conservative measures with limited results.  She then underwent an EMG which showed significant compression.  She elected to go ahead with operative intervention.     PROCEDURE NOTE:    After informed consent was received from the patient, having listed all the risks and benefits as noted on the consent form but not limited to the consent form and having discussed all conservative, surgical and alternatives to treatment, the patient signed a consent form with a witness present.  The patient understood there are numerous risks, all of them were not written down but were discussed at length. No guarantees were given. All questions were answered prior to signing consent form. The patient was given antibiotics one-half hour prior to skin incision.  She was taken back to the operating room  supine on a gurney, transferred to the operating room table, secured, and all bony prominences were padded. She was then given general anesthesia via LMA.  Once proper anesthesia was obtained, tourniquet was placed and inflated and the patient's right upper extremity was sterilely prepped and draped.    A time out performed per institutional guidelines.  At this time, I then marked my incision site, elevated the arm and inflated the tourniquet. Skin incision only was made with a #15 scalpel.   Blunt dissection was performed until the transverse carpal ligament was identified.  I made a small nick incision in the transverse carpal ligament. Passed the [place]  proximally and distally and released the proximal and distal components of the transverse carpal ligament under direct visualization. The nerve was examined, showing it to have an hourglass deformity. I released the tourniquet and noted a hyperemic effect into the nerve.  There was also some volar and dorsal compression.      I irrigated copiously, maintained hemostasis and then irrigated again.  I closed the deeper subcutaneous with a 4-0 Stratafix in a running fashion.  The skin was closed with 3-0 nylon with horizontal mattress stitch configuration as well as some interrupted sutures.  The area was infiltrated with local.     Sterile dressings including Xeroform, 4 x 4, ABD and a well-padded cockup wrist splint was applied.  The patient was extubated, transferred back to the [facility], taken to the recovery room in satisfactory condition.  She will be discharged home per protocol.  She was given a prescription for Norco 5/325, #5, no refills and she will follow up in the office as already arranged.      DO Esequiel CROUCH  D:11/08/2017 08:08:31  T:11/08/2017 08:36:11  VJID: [id number]  TJID: [id number]    cc:

## 2017-12-29 NOTE — H&P
"Patient Information     Patient Name MRN Fabiana Preston [id number] Female 1970      H&P by Gladys Jones NP at 10/23/2017 10:00 AM     Author:  Gladys Jones NP Service:  (none) Author Type:  PHYS- Nurse Practitioner     Filed:  10/23/2017 11:25 AM Encounter Date:  10/23/2017 Status:  Signed     :  Gladys Jones NP (PHYS- Nurse Practitioner)            ----------------- PREOPERATIVE EXAM ------------------  10/23/2017    SUBJECTIVE:  Fabiana Sanders is a 47 y.o. female here for preoperative optimization.    Nursing Notes:   Phylicia Jones  10/23/2017 10:09 AM  Signed  Date of Surgery: 17   Type of Surgery: Right carpel tunnel release  Surgeon: Dr. Das  Hospital:  [facility]  Fax: N/A    Fever/Chills or other infectious symptoms in past month: NO  >10lb weight loss in past two months: NO  O2 SAT: 100    Health Care Directive/Code status:  NO; CPR  Hx of blood transfusions:   (NO)   Td up to date:  YES  History of VRE/MRSA:  (NO) Date: NA    Preoperative Evaluation: Obstructive Sleep Apnea screening    S: Snore -  Do you snore loudly? (louder than talking or loud enough to be heard through closed doors)(NO)  T: Tired - Do you often feel tired, fatigued, or sleepy during the daytime?(YES)  O: Observed - Has anyone ever observed you stop breathing during your sleep?(NO)  P: Pressure - Do you have or are you being treated for high blood pressure?(YES)  B: BMI - BMI greater than 35kg/m2?(YES)  A: Age - Age over 50 years old?(NO)  N: Neck - Neck circumference greater than 40 cm?(YES)  G: Gender - Gender: Male?(NO)    Total number of \"YES\" responses:  4    Scoring: Low risk of VALENTIN 0-2  At Risk of VALENTIN: >3 High Risk of VALENTIN: 5-8    Phylicia Jones LPN...................  10/23/2017   10:01 AM        Phylicia Jones  10/23/2017 11:02 AM  Signed  The right ear canal was irrigated with body-temperature tap water with the jet of water directed superiorly.  The right ear " canal was then re-examined and cleared of the impaction.  The patient tolerated the procedure well.  Phylicia Jones LPN..............10/23/2017 11:02 AM      I was asked to see Fabiana Sanders by Dr. Das for a preoperative optimization due to hyperlipidemia, chronic back pain and osteoarthritis. She had a pain contract with Dr Chavez in [place]. She was taking hydrocodone-APAP 10/325 mg but does not have the pain medications currently. She does not have a PCP currently.       Patient Active Problem List      Diagnosis Date Noted     Other hyperlipidemia 10/23/2017     Bilateral carpal tunnel syndrome 10/16/2017       Past Medical History:     Diagnosis  Date     Bilateral carpal tunnel syndrome 10/16/2017       Past Surgical History:      Procedure  Laterality Date     INGUINAL HERNIA REPAIR  2007     PARTIAL HYSTERECTOMY  2009    has both ovaries       TUBAL LIGATION  2001       Current Outpatient Prescriptions       Medication  Sig Dispense Refill     atorvastatin (LIPITOR) 20 mg tablet Take 20 mg by mouth once daily.       cyclobenzaprine (FLEXERIL) 10 mg tablet Take 1 tablet by mouth 3 times daily if needed for Muscle Spasm.       diclofenac 1 % topical (VOLTAREN) gel Apply 4 g topically to affected area(s) 4 times daily.       meloxicam (MOBIC) 15 mg tablet 15 mg, 1 tab BID PRN       traMADol (ULTRAM) 50 mg tablet Take 2 tablets by mouth 2 times daily.       traZODone (DESYREL) 50 mg tablet 50 mg 1-2 tabs at HS       venlafaxine (EFFEXOR XR) 75 mg cp24 Extended-Release capsule Take 150 mg by mouth once daily with a meal.       No current facility-administered medications for this visit.      Medications have been reviewed by me and are current to the best of my knowledge and ability.    Recent use of: NSAIDS    Allergies:  No Known Allergies    Latex allergy  no    Family History       Problem   Relation Age of Onset     Throat cancer  Father      Other  Father      brain tumor       Cancer  Maternal  "Grandmother      \"female organs\"       Stroke  Paternal Grandmother      Throat cancer  Paternal Grandfather        Denies family hx of bleeding tendencies, anesthesia complications, or other problems with surgery.      Social History      Substance Use Topics        Smoking status:  Former Smoker     Packs/day: 1.00     Smokeless tobacco:  Never Used     Alcohol use  No         ROS:    surgical:  patient denies previous complications from prior surgeries including but not limited to prolonged bleeding, anesthesia complications, dysrhythmias, surgical wound infections, or prolonged hospital stay.      REVIEW OF SYSTEMS:  A comprehensive review of systems was negative except for items noted in HPI/Subjective.    Constitutional: Negative for fever , chills  and fatigue .   Eyes: Negative for irritation  and redness .  Ears, nose, mouth, throat, and face: Negative for ear drainage , nasal congestion , sore throat and hoarseness .  Respiratory: Negative for cough, sputum production , hemoptysis, dyspnea  and wheezing .  Cardiovascular: Negative for chest discomfort, palpitations and lightheadedness .  Gastrointestinal: Negative for dysphagia , nausea , vomiting , melena , diarrhea , constipation  and abdominal pain.  Genitourinary:Negative for frequency, dysuria , urinary incontinence , hematuria.  Integument/breast: Negative for rash .   Hematologic/lymphatic: Negative for easy bruising , bleeding, lymphadenopathy  and petechiae.   Musculoskeletal: chronic back pain  Neurological: Negative for headaches, dizziness , vertigo , seizures  and weakness.   Psychiatric: Negative for anxiety , depression , panic attacks  and suicidal ideations .        -------------------------------------------------------------    PHYSICAL EXAM:  /82  Pulse 98  Temp 96.8  F (36  C) (Tympanic)  Resp 18  Ht 1.626 m (5' 4\")  Wt 99.5 kg (219 lb 6.4 oz)  SpO2 100%  BMI 37.66 kg/m2    EXAM:  General Appearance: Pleasant, alert, " appropriate appearance for age. No acute distress  Head Exam: Normal. Normocephalic, atraumatic.  Eye Exam: Normal external eye, conjunctiva, lids, cornea. LUISANA.  Ear Exam: Normal TM's bilaterally. Normal auditory canals and external ears. Non-tender.  Nose Exam: Normal external nose, mucus membranes, and septum.  OroPharynx Exam: Dental hygiene adequate. Normal buccal mucosa. Normal pharynx.  Neck Exam: Supple, no masses or nodes., no lymphadenopathy  Thyroid Exam: Normal., No nodules or enlargement., normal to palpation  Chest/Respiratory Exam: Normal chest wall and respirations. Clear to auscultation.  Cardiovascular Exam: Regular rate and rhythm. S1, S2, no murmur, click, gallop, or rubs.  Gastrointestinal Exam: Soft, nontender, no abnormal masses or organomegaly. BS x 4.  Lymphatic Exam: Normal.  Musculoskeletal Exam: Back is straight and non-tender, full ROM of upper and lower extremities.  Skin: no rash or abnormalities  Neurologic Exam:symmetric DTRs, normal gross motor movement, tone, and coordination. No tremor.  Psychiatric Exam: Alert and oriented, appropriate affect.    PHQ Depression Screen  Date of PHQ exam: 10/23/17  Over the last 2 weeks, how often have you been bothered by any of the following problems?  1. Little interest or pleasure in doing things: 2 - More than half the days  2. Feeling down, depressed, or hopeless: 1 - Several days  3. Trouble falling or staying asleep, or sleeping too much: 1 - Several days  4. Feeling tired or having little energy: 1 - Several days  5. Poor appetite or overeatin - Several days  6. Feeling bad about yourself - or that you are a failure or have let yourself or your family down: 1 - Several days  7. Trouble concentrating on things, such as reading the newspaper or watching television: 0 - Not at all  8. Moving or speaking so slowly that other people could have noticed. Or the opposite - being so fidgety or restless that you have been moving around a lot  more than usual: 0 - Not at all  9. Thoughts that you would be better off dead, or of hurting yourself in some way: 0 - Not at all    PHQ-9 TOTAL SCORE: 7  Depression Severity Level: mild       Patient can walk up a flight of stairs without becoming short of breath or having chest pain: YES   Patient is able to tolerate greater than 4 METs of activity without any cardiopulmonary symptoms.    LABS:    Results for orders placed or performed in visit on 10/23/17       BASIC METABOLIC PANEL       Result  Value Ref Range Status    SODIUM 134 133 - 143 mmol/L Final    POTASSIUM 4.0 3.5 - 5.1 mmol/L Final    CHLORIDE 103 98 - 107 mmol/L Final    CO2,TOTAL 23 21 - 31 mmol/L Final    ANION GAP 8 5 - 18                 Final    GLUCOSE 83 70 - 105 mg/dL Final    CALCIUM 9.8 8.6 - 10.3 mg/dL Final    BUN 11 7 - 25 mg/dL Final    CREATININE 0.90 0.70 - 1.30 mg/dL Final    BUN/CREAT RATIO           12                 Final    GFR if African American >60 >60 ml/min/1.73m2 Final    GFR if not African American >60 >60 ml/min/1.73m2 Final   CBC WITH AUTO DIFFERENTIAL       Result  Value Ref Range Status    WHITE BLOOD COUNT         12.3 (H) 4.5 - 11.0 thou/cu mm Final    RED BLOOD COUNT           4.45 4.00 - 5.20 mil/cu mm Final    HEMOGLOBIN                13.5 12.0 - 16.0 g/dL Final    HEMATOCRIT                39.3 33.0 - 51.0 % Final    MCV                       88 80 - 100 fL Final    MCH                       30.3 26.0 - 34.0 pg Final    MCHC                      34.4 32.0 - 36.0 g/dL Final    RDW                       12.9 11.5 - 15.5 % Final    PLATELET COUNT            292 140 - 440 thou/cu mm Final    MPV                       9.4 6.5 - 11.0 fL Final    NEUTROPHILS               71.7 42.0 - 72.0 % Final    LYMPHOCYTES               20.3 20.0 - 44.0 % Final    MONOCYTES                 4.7 <12.0 % Final    EOSINOPHILS               1.9 <8.0 % Final    BASOPHILS                 0.9 <3.0 % Final    IMMATURE  GRANULOCYTES(METAS,MYELOS,PROS) 0.5 % Final    ABSOLUTE NEUTROPHILS      8.8 (H) 1.7 - 7.0 thou/cu mm Final    ABSOLUTE LYMPHOCYTES      2.5 0.9 - 2.9 thou/cu mm Final    ABSOLUTE MONOCYTES        0.6 <0.9 thou/cu mm Final    ABSOLUTE EOSINOPHILS      0.2 <0.5 thou/cu mm Final    ABSOLUTE BASOPHILS        0.1 <0.3 thou/cu mm Final    ABSOLUTE IMMATURE GRANULOCYTES(METAS,MYELOS,PROS) 0.1 <=0.3 thou/cu mm Final         CXR:  n/a    EKG: n/a, reports EKG 1/2017 in [place] was normal  ---------------------------------------------------------------      No family history of problems with bleeding or anesthetia.    ASA PS class II. Patient's perioperative risk is minimized and no further cardiopulmonary workup is neccesary.  Please contact the office with any questions or concerns.      ICD-10-CM    1. Preop examination Z01.818 CBC WITH DIFFERENTIAL      BASIC METABOLIC PANEL      CBC WITH DIFFERENTIAL      BASIC METABOLIC PANEL      CBC WITH AUTO DIFFERENTIAL   2. Other hyperlipidemia E78.4    3. Bilateral carpal tunnel syndrome G56.03    4. Impacted cerumen of right ear H61.21 EAR WAX REMOVAL       ASSESSEMNT AND PLAN:  1.  Preoperative history and physical   consults:  None  Patient is approved for the surgery.  No concerns.     For above listed surgery and anesthesia:     - Patient is low  risk for perioperative complications.     Patient was administered the following vaccines today: declines influenza vaccine, will do at pharmacy.  Completed labs today: CBC, no concerns.    PRE OP RECOMMENDATIONS:  Patient is on chronic pain medications (YES)   Patient is on antiplatlet/anticoagulation (NO)  Other medications that need adjustment perioperatively (NO)     There are no Patient Instructions on file for this visit.    Other:  Patient was advised to call our office and the surgical services with any change in condition or new symptoms if they were to develop between today and their surgical date.  Especially any  cardiopulmonary symptoms or symptoms concerning for an infection.     PRE OP RECOMMENDATIONS:  Discontinue ASA 7 days prior to reduce bleeding risk and Discontinue NSAIDS 7 days prior to procedure to reduce bleeding risk    Greater than 25 minutes were spent in counseling and coordination of care.    ABA RUBIO NP..................10/23/2017 10:08 AM

## 2017-12-30 NOTE — NURSING NOTE
"Patient Information     Patient Name MRN Sex Fabiana Vieira [id number] Female 1970      Nursing Note by Phylicia Jones at 10/23/2017 10:00 AM     Author:  Phylicia Jones Service:  (none) Author Type:  (none)     Filed:  10/23/2017 10:09 AM Encounter Date:  10/23/2017 Status:  Signed     :  Phylicia Jones            Date of Surgery: 17   Type of Surgery: Right carpel tunnel release  Surgeon: Dr. Das  Hospital[facility]  [facility]  Fax: N/A    Fever/Chills or other infectious symptoms in past month: NO  >10lb weight loss in past two months: NO  O2 SAT: 100    Health Care Directive/Code status:  NO; CPR  Hx of blood transfusions:   (NO)   Td up to date:  YES  History of VRE/MRSA:  (NO) Date: NA    Preoperative Evaluation: Obstructive Sleep Apnea screening    S: Snore -  Do you snore loudly? (louder than talking or loud enough to be heard through closed doors)(NO)  T: Tired - Do you often feel tired, fatigued, or sleepy during the daytime?(YES)  O: Observed - Has anyone ever observed you stop breathing during your sleep?(NO)  P: Pressure - Do you have or are you being treated for high blood pressure?(YES)  B: BMI - BMI greater than 35kg/m2?(YES)  A: Age - Age over 50 years old?(NO)  N: Neck - Neck circumference greater than 40 cm?(YES)  G: Gender - Gender: Male?(NO)    Total number of \"YES\" responses:  4    Scoring: Low risk of VALENTIN 0-2  At Risk of VALENTIN: >3 High Risk of VALENTIN: 5-8    Phylicia Jones LPN...................  10/23/2017   10:01 AM            "

## 2017-12-30 NOTE — NURSING NOTE
Patient Information     Patient Name MRN Fabiana Preston [id number] Female 1970      Nursing Note by Lilli Esqueda at 2017  8:15 AM     Author:  Lilli Esqueda Service:  (none) Author Type:  (none)     Filed:  2017  8:46 AM Encounter Date:  2017 Status:  Signed     :  Lilli Esqueda            Patient here to establish care with MD Lilli Almanzar, AXEL ..........2017 8:20 AM

## 2017-12-30 NOTE — NURSING NOTE
Patient Information     Patient Name MRN Sex Fabiana Vieira [id number] Female 1970      Nursing Note by Gosselin, Norma J at 10/16/2017  8:45 AM     Author:  Gosselin, Norma J Service:  (none) Author Type:  (none)     Filed:  10/16/2017  8:23 AM Encounter Date:  10/16/2017 Status:  Signed     :  Gosselin, Norma J            Referred by Amy Olivia CNP from [facility] for consult bilateral CTS.  Norma J Gosselin LPN....................  10/16/2017   8:22 AM

## 2017-12-30 NOTE — NURSING NOTE
Patient Information     Patient Name MRN Sex Fabiana Vieira [id number] Female 1970      Nursing Note by Gosselin, Norma J at 2017  8:15 AM     Author:  Gosselin, Norma J Service:  (none) Author Type:  (none)     Filed:  2017  8:21 AM Encounter Date:  2017 Status:  Signed     :  Gosselin, Norma J            Post-op Right CTR dos-17  Norma J Gosselin LPN....................  2017   8:21 AM

## 2017-12-30 NOTE — NURSING NOTE
Patient Information     Patient Name MRN Sex Fabiana Vieira [id number] Female 1970      Nursing Note by Phylicia Jones at 10/23/2017 10:00 AM     Author:  Phylicia Jones Service:  (none) Author Type:  (none)     Filed:  10/23/2017 11:02 AM Encounter Date:  10/23/2017 Status:  Signed     :  Phylicia Jones            The right ear canal was irrigated with body-temperature tap water with the jet of water directed superiorly.  The right ear canal was then re-examined and cleared of the impaction.  The patient tolerated the procedure well.  Phylicia Jones LPN..............10/23/2017 11:02 AM

## 2018-01-03 LAB
6-MONOACETYL MORPHINE - HISTORICAL: ABNORMAL NG/ML
AMPHETAMINE URINE - HISTORICAL: ABNORMAL NG/ML
BARBITURATE URINE - HISTORICAL: ABNORMAL NG/ML
BENZODIAZEPINE URINE - HISTORICAL: ABNORMAL NG/ML
BUPRENORPHRINE URINE - HISTORICAL: ABNORMAL NG/ML
COCAINE METAB URINE - HISTORICAL: ABNORMAL NG/ML
CREAT UR - HISTORICAL: 18 MG/DL
ETHYLGLUCURONIDE URINE - HISTORICAL: ABNORMAL NG/ML
FENTANYL URINE - HISTORICAL: ABNORMAL NG/ML
MASS SPECTROMETRY URINE - HISTORICAL: ABNORMAL
METHADONE URINE - HISTORICAL: ABNORMAL NG/ML
OPIATES URINE - HISTORICAL: ABNORMAL NG/ML
OXYCODONE URINE - HISTORICAL: ABNORMAL NG/ML
PH URINE - HISTORICAL: 7.4
PROPOXYPHENE URINE - HISTORICAL: ABNORMAL NG/ML
THC 50 URINE - HISTORICAL: ABNORMAL NG/ML
TRAMADOL - HISTORICAL: ABNORMAL NG/ML

## 2018-01-17 ENCOUNTER — COMMUNICATION - GICH (OUTPATIENT)
Dept: FAMILY MEDICINE | Facility: OTHER | Age: 48
End: 2018-01-17

## 2018-01-17 DIAGNOSIS — M79.7 FIBROMYALGIA: ICD-10-CM

## 2018-01-26 VITALS
OXYGEN SATURATION: 100 % | HEIGHT: 64 IN | WEIGHT: 219.4 LBS | HEART RATE: 98 BPM | TEMPERATURE: 96.8 F | BODY MASS INDEX: 37.46 KG/M2 | RESPIRATION RATE: 18 BRPM | SYSTOLIC BLOOD PRESSURE: 118 MMHG | DIASTOLIC BLOOD PRESSURE: 82 MMHG

## 2018-01-26 VITALS
BODY MASS INDEX: 37.39 KG/M2 | HEART RATE: 64 BPM | TEMPERATURE: 98.2 F | DIASTOLIC BLOOD PRESSURE: 80 MMHG | SYSTOLIC BLOOD PRESSURE: 134 MMHG | HEIGHT: 64 IN | WEIGHT: 219 LBS

## 2018-01-26 VITALS
HEIGHT: 64 IN | BODY MASS INDEX: 36.37 KG/M2 | SYSTOLIC BLOOD PRESSURE: 132 MMHG | HEART RATE: 110 BPM | WEIGHT: 213 LBS | DIASTOLIC BLOOD PRESSURE: 88 MMHG

## 2018-01-26 VITALS
BODY MASS INDEX: 39.48 KG/M2 | HEART RATE: 64 BPM | SYSTOLIC BLOOD PRESSURE: 118 MMHG | WEIGHT: 230 LBS | DIASTOLIC BLOOD PRESSURE: 66 MMHG

## 2018-01-28 ENCOUNTER — HEALTH MAINTENANCE LETTER (OUTPATIENT)
Age: 48
End: 2018-01-28

## 2018-02-02 ASSESSMENT — PATIENT HEALTH QUESTIONNAIRE - PHQ9
SUM OF ALL RESPONSES TO PHQ QUESTIONS 1-9: 7
SUM OF ALL RESPONSES TO PHQ QUESTIONS 1-9: 7

## 2018-02-09 VITALS
DIASTOLIC BLOOD PRESSURE: 68 MMHG | SYSTOLIC BLOOD PRESSURE: 122 MMHG | HEART RATE: 60 BPM | WEIGHT: 232.2 LBS | BODY MASS INDEX: 39.86 KG/M2

## 2018-02-12 NOTE — PROGRESS NOTES
Patient Information     Patient Name MRN Sex Fabiana Vieira [id number] Female 1970      Progress Notes by Radha Humphrey MD at 2017  9:30 AM     Author:  Radha Humphrey MD Service:  (none) Author Type:  Physician     Filed:  2017  6:31 PM Encounter Date:  2017 Status:  Signed     :  Radha Humphrey MD (Physician)            SUBJECTIVE:    Fabiana Sanders is a 47 y.o. female who presents for follow-up.    HPI Comments: Patient presents to follow-up, one month after establishing care. Records from [facility] and from the arthritis Center in [place] are available for review.  She feels that she is doing fine on tramadol, she has been out of [place] for the last couple of weeks. She is okay with the plan to continue on tramadol, but does request that she have the option to use it 4 times daily if needed.  She is participating in physical therapy, we'll transition to water based therapy next week after she is fully healed from recent bilateral carpal tunnel surgery. She is finding physical therapy to be very helpful, and is hopeful that they water based therapy will continue to provide benefit.    We did spend some time discussing her previous urine tox screen. Benzodiazepines were present on this. She is not prescribed benzodiazepines. She notes that her  has Xanax. One day, about 5 weeks ago, she was getting his medications ready with her medications. When she went to hand him his medications, he noted that they weren't his pills. She then realized that she had actually taken his pills. This happened one time previously, about one year ago. At that point it was recommended that she not take her Effexor, due to having taken her 's Xanax. So she followed the same recommendations. But she suspects this is why benzodiazepines are present in her urine tox screen. Has not had any exposure since.      Patient Active Problem List      Diagnosis Date Noted     Fibromyalgia 2017      Chronic midline low back pain without sciatica 11/27/2017     S/P right carpal tunnel release 11/08/2017     Other hyperlipidemia 10/23/2017     Bilateral carpal tunnel syndrome 10/16/2017       REVIEW OF SYSTEMS:  ROS see history of present illness, review of systems is otherwise negative.    OBJECTIVE:  /68 (Cuff Site: Right Arm, Position: Sitting, Cuff Size: Adult Regular)  Pulse 60  Wt 105.3 kg (232 lb 3.2 oz)  BMI 39.86 kg/m2    EXAM:   Physical Exam   Constitutional: She is oriented to person, place, and time and well-developed, well-nourished, and in no distress.   Eyes: Conjunctivae are normal.   Cardiovascular: Normal rate.    Pulmonary/Chest: Effort normal.   Neurological: She is alert and oriented to person, place, and time.   Skin: No rash noted.   Psychiatric: Mood and affect normal.       ASSESSMENT/PLAN:    ICD-10-CM    1. Osteoarthritis, unspecified osteoarthritis type, unspecified site M19.90    2. Fibromyalgia M79.7 traMADol (ULTRAM) 50 mg tablet      COMPLIANCE DRUG ANALYSIS      COMPLIANCE DRUG ANALYSIS      atorvastatin (LIPITOR) 40 mg tablet   3. Chronic midline low back pain without sciatica M54.5 COMPLIANCE DRUG ANALYSIS     G89.29 COMPLIANCE DRUG ANALYSIS   4. Other hyperlipidemia E78.4 LIPID PANEL      LIPID PANEL        Plan:    Repeat urine tox screen today, expect no benzodiazepines to be present. If present will need to reevaluate pain management plan. Discussed with patient the importance of not taking medications not prescribed to her, and to continue to work on a safe way to distribute medications within her family. Will increase tramadol to 2 tabs 4 times a day, patient will reduce use if able to with improvement during PT.  One-month supply provided, with 5 refills. [facility] database is reviewed and signed. Patient reports initiation of Lipitor 20 mg earlier this year. Will check lipid profile today. Patient will follow-up in 6 months, sooner with concerns.    Radha Humphrey,  MD

## 2018-02-12 NOTE — NURSING NOTE
Patient Information     Patient Name MRN Fabiana Preston [id number] Female 1970      Nursing Note by Lilli Esqueda at 2017  9:30 AM     Author:  Lilli Esqueda Service:  (none) Author Type:  (none)     Filed:  2017  9:49 AM Encounter Date:  2017 Status:  Signed     :  Lilli Esqueda            Patient here for 1 month medication management.   Lilli Esqueda LPN ..........2017 9:34 AM

## 2018-02-12 NOTE — PROGRESS NOTES
Patient Information     Patient Name MRN Sex Fabiana Vieira [id number] Female 1970      Progress Notes by Radha Humphrey MD at 2017 12:09 PM     Author:  Radha Humphrey MD Service:  (none) Author Type:  Physician     Filed:  2017 12:09 PM Encounter Date:  2017 Status:  Signed     :  Radha Humphrey MD (Physician)            Letter mailed to patient.

## 2018-02-13 NOTE — TELEPHONE ENCOUNTER
Patient Information     Patient Name MRN Sex Fabiana Vieira [id number] Female 1970      Telephone Encounter by Basilia Warren RN at 2018  9:42 AM     Author:  Basilia Warren RN Service:  (none) Author Type:  NURS- Registered Nurse     Filed:  2018  9:45 AM Encounter Date:  2018 Status:  Signed     :  Basilia Warren RN (NURS- Registered Nurse)            This is a Refill request from: [facility]   Name of Medication: cyclobenzaprine (FLEXERIL) 10 mg tablet  Quantity requested: 90  Last fill date: 17  Due for refill: 17  Last visit with JESUS MEJIA was on: 2017  Controlled Substance Agreement:  na   Diagnosis r/t this medication request: Fibromyalgia     Unable to complete prescription refill per RN Medication Refill Policy.................... Basilia Warren RN ....................  2018   9:42 AM

## 2018-02-14 DIAGNOSIS — M79.7 FIBROMYALGIA: Primary | ICD-10-CM

## 2018-02-14 RX ORDER — CYCLOBENZAPRINE HCL 10 MG
10 TABLET ORAL 3 TIMES DAILY PRN
COMMUNITY
Start: 2017-11-27 | End: 2018-02-14

## 2018-02-16 RX ORDER — CYCLOBENZAPRINE HCL 10 MG
10 TABLET ORAL 3 TIMES DAILY PRN
Qty: 90 TABLET | Refills: 0 | Status: SHIPPED | OUTPATIENT
Start: 2018-02-16 | End: 2018-03-19

## 2018-02-16 NOTE — TELEPHONE ENCOUNTER
PCP is out. Will route to teamlet for consideration.      Flexeril 10 mg  Last Written Prescription Date: 01/19/2018  Last Fill Quantity: 90,   # refills: 0  Last Office Visit: 12/22/2017 with Dr. Humphrey  Future Office visit:       Routing refill request to provider for review/approval because:  Drug not on the [facility], [facility] or [facility] refill protocol or controlled substance    Unable to complete prescription refill per RNMedication Refill Policy.................... Basilia Warren ....................  2/16/2018   10:05 AM

## 2018-03-08 ENCOUNTER — DOCUMENTATION ONLY (OUTPATIENT)
Dept: FAMILY MEDICINE | Facility: OTHER | Age: 48
End: 2018-03-08

## 2018-03-08 PROBLEM — G56.03 BILATERAL CARPAL TUNNEL SYNDROME: Status: ACTIVE | Noted: 2017-10-16

## 2018-03-08 PROBLEM — M79.7 FIBROMYALGIA: Status: ACTIVE | Noted: 2017-11-27

## 2018-03-08 PROBLEM — M54.50 CHRONIC MIDLINE LOW BACK PAIN WITHOUT SCIATICA: Status: ACTIVE | Noted: 2017-11-27

## 2018-03-08 PROBLEM — G89.29 CHRONIC MIDLINE LOW BACK PAIN WITHOUT SCIATICA: Status: ACTIVE | Noted: 2017-11-27

## 2018-03-08 PROBLEM — Z98.890 S/P CARPAL TUNNEL RELEASE: Status: ACTIVE | Noted: 2017-11-08

## 2018-03-08 PROBLEM — E78.49 OTHER HYPERLIPIDEMIA: Status: ACTIVE | Noted: 2017-10-23

## 2018-03-08 RX ORDER — MELOXICAM 15 MG/1
TABLET ORAL
COMMUNITY
Start: 2015-11-15 | End: 2018-11-09

## 2018-03-08 RX ORDER — TRAMADOL HYDROCHLORIDE 50 MG/1
100 TABLET ORAL 4 TIMES DAILY
COMMUNITY
Start: 2017-12-27 | End: 2018-06-12

## 2018-03-08 RX ORDER — ACETAMINOPHEN 500 MG
1000 TABLET ORAL EVERY 6 HOURS PRN
COMMUNITY

## 2018-03-08 RX ORDER — VENLAFAXINE HYDROCHLORIDE 75 MG/1
75 CAPSULE, EXTENDED RELEASE ORAL 2 TIMES DAILY
COMMUNITY
Start: 2017-11-27 | End: 2018-04-10

## 2018-03-08 RX ORDER — TRAZODONE HYDROCHLORIDE 50 MG/1
TABLET, FILM COATED ORAL
COMMUNITY
Start: 2017-11-27 | End: 2018-12-03

## 2018-03-08 RX ORDER — HYDROCODONE BITARTRATE AND ACETAMINOPHEN 5; 325 MG/1; MG/1
1 TABLET ORAL EVERY 4 HOURS PRN
COMMUNITY
Start: 2017-11-08 | End: 2018-04-05

## 2018-03-08 RX ORDER — ATORVASTATIN CALCIUM 40 MG/1
40 TABLET, FILM COATED ORAL DAILY
COMMUNITY
Start: 2017-12-27 | End: 2018-11-09

## 2018-03-19 DIAGNOSIS — M79.7 FIBROMYALGIA: ICD-10-CM

## 2018-03-19 RX ORDER — CYCLOBENZAPRINE HCL 10 MG
10 TABLET ORAL 3 TIMES DAILY PRN
Qty: 90 TABLET | Refills: 5 | Status: SHIPPED | OUTPATIENT
Start: 2018-03-19 | End: 2018-09-11

## 2018-03-19 NOTE — TELEPHONE ENCOUNTER
Flexeril 10 mg  Last Written Prescription Date:  02/19/18  Last Fill Quantity: 90,   # refills: 0  Last Office Visit: 12/27/2017  Future Office visit:       Routing refill request to provider for review/approval because:   Drug not on the [facility], [facility] or [facility] refill protocol or controlled substance    Unable to complete prescription refill per RNMedication Refill Policy.................... Basilia Warren .Leatha.................  3/19/2018   9:22 AM

## 2018-04-05 ENCOUNTER — TELEPHONE (OUTPATIENT)
Dept: FAMILY MEDICINE | Facility: OTHER | Age: 48
End: 2018-04-05

## 2018-04-05 ENCOUNTER — OFFICE VISIT (OUTPATIENT)
Dept: FAMILY MEDICINE | Facility: OTHER | Age: 48
End: 2018-04-05
Attending: NURSE PRACTITIONER
Payer: COMMERCIAL

## 2018-04-05 VITALS
DIASTOLIC BLOOD PRESSURE: 76 MMHG | HEART RATE: 122 BPM | BODY MASS INDEX: 40.34 KG/M2 | TEMPERATURE: 97.3 F | WEIGHT: 236.3 LBS | HEIGHT: 64 IN | RESPIRATION RATE: 18 BRPM | SYSTOLIC BLOOD PRESSURE: 128 MMHG | OXYGEN SATURATION: 94 %

## 2018-04-05 DIAGNOSIS — B37.2 YEAST DERMATITIS: Primary | ICD-10-CM

## 2018-04-05 DIAGNOSIS — F43.23 ADJUSTMENT DISORDER WITH MIXED ANXIETY AND DEPRESSED MOOD: Primary | ICD-10-CM

## 2018-04-05 PROCEDURE — G0463 HOSPITAL OUTPT CLINIC VISIT: HCPCS

## 2018-04-05 PROCEDURE — 99213 OFFICE O/P EST LOW 20 MIN: CPT | Performed by: NURSE PRACTITIONER

## 2018-04-05 RX ORDER — NYSTATIN 100000 U/G
CREAM TOPICAL 3 TIMES DAILY
Qty: 30 G | Refills: 0 | Status: SHIPPED | OUTPATIENT
Start: 2018-04-05 | End: 2018-04-12

## 2018-04-05 RX ORDER — NYSTATIN 100000 [USP'U]/G
POWDER TOPICAL 3 TIMES DAILY PRN
Qty: 30 G | Refills: 0 | Status: SHIPPED | OUTPATIENT
Start: 2018-04-05 | End: 2018-04-12

## 2018-04-05 ASSESSMENT — PAIN SCALES - GENERAL: PAINLEVEL: MILD PAIN (2)

## 2018-04-05 NOTE — NURSING NOTE
Patient presents to the clinic for rash underneath her right breast. Rash started a couple days ago, but now this morning noticed rash going over under left breast. States it is very painful and has a burning sensation.   Alyse Shepherd LPN............. April 5, 2018 10:11 AM

## 2018-04-05 NOTE — TELEPHONE ENCOUNTER
Pt advised that PCP is out until next week, ok to address upon return    Spoke with patient, she states that she was taking 2 tabs of the Effexor.  Went to go  script that was written at last office visit (11/17) and it states to only take one  Leticia Israel LPN.......4/5/2018 9:21 AM

## 2018-04-05 NOTE — PROGRESS NOTES
Nursing Notes:   Alyse Shepherd LPN  4/5/2018 10:11 AM  Unsigned  Patient presents to the clinic for rash underneath her right breast. Rash started a couple days ago, but now this morning noticed rash going over under left breast. States it is very painful and has a burning sensation.   Alyse Shepherd LPN............. April 5, 2018 10:11 AM       SUBJECTIVE:   Fabiana Sanders is a 47 year old female who presents to clinic today for the following health issues:    Rash      Duration: 2 days    Description  Location: Under breasts, started under RT side now under the LT  Itching: mild    Intensity:  mild    Accompanying signs and symptoms: No fevers, chills, no s/s of systemic illness.     History (similar episodes/previous evaluation): None    Precipitating or alleviating factors:  New exposures:  None  Recent travel: no      Therapies tried and outcome: none    Problem list and histories reviewed & adjusted, as indicated.  Additional history: as documented    Current Outpatient Prescriptions   Medication Sig Dispense Refill     nystatin (MYCOSTATIN) cream Apply topically 3 times daily for 7 days 30 g 0     nystatin (MYCOSTATIN) 402756 UNIT/GM POWD Apply topically 3 times daily as needed 30 g 0     cyclobenzaprine (FLEXERIL) 10 MG tablet Take 1 tablet (10 mg) by mouth 3 times daily as needed 90 tablet 5     acetaminophen (TYLENOL) 500 MG tablet Take 1,000 mg by mouth every 6 hours as needed Max acetaminophen dose: 4000 mg in 24 hours       atorvastatin (LIPITOR) 40 MG tablet Take 40 mg by mouth daily       meloxicam (MOBIC) 15 MG tablet 15 mg, 1 tab BID PRN       traMADol (ULTRAM) 50 MG tablet Take 100 mg by mouth 4 times daily       traZODone (DESYREL) 50 MG tablet 50 mg 1-2 tabs at HS       venlafaxine (EFFEXOR-XR) 75 MG 24 hr capsule Take 75 mg by mouth daily with food       No Known Allergies    Reviewed and updated as needed this visit by clinical staff  Tobacco  Allergies  Meds       Reviewed  "and updated as needed this visit by Provider     ROS:  A comprehensive 10 point ROS was obtained and documented for notable findings in the HPI.       OBJECTIVE:     /76 (BP Location: Right arm, Patient Position: Sitting, Cuff Size: Adult Regular)  Pulse 122  Temp 97.3  F (36.3  C) (Tympanic)  Resp 18  Ht 5' 4\" (1.626 m)  Wt 236 lb 4.8 oz (107.2 kg)  SpO2 94%  Breastfeeding? No  BMI 40.56 kg/m2  Body mass index is 40.56 kg/(m^2).  GENERAL: healthy, alert and no distress  EYES: Eyes grossly normal to inspection  RESP: with ease  BREAST: Examination of the rash reveals: Candida: flat, intensely inflamed, well-defined, clustered bright red macules, Under the RT breast, going to the LT.     Diagnostic Test Results:  none     ASSESSMENT/PLAN:     1. Yeast dermatitis  - nystatin (MYCOSTATIN) cream; Apply topically 3 times daily for 7 days  Dispense: 30 g; Refill: 0  - nystatin (MYCOSTATIN) 507890 UNIT/GM POWD; Apply topically 3 times daily as needed  Dispense: 30 g; Refill: 0    Medical Decision Making:    Differential Diagnosis:  Candidiasis  Dermatitis  Impetigo    Serious Comorbid Conditions:  Adult:  None    PLAN:    Rash:  OTC hydrocortisone prn  Reassurance was given to the patient  Keep area clean and dry. Home cares discussed.     Followup:    If not improving or if condition worsens, follow up with your Primary Care Provider        Fabiana Lantigua NP, 4/5/2018 10:17 AM         "

## 2018-04-05 NOTE — PATIENT INSTRUCTIONS
Candida Skin Infection (Adult)  Candida is type of yeast. It grows naturally on the skin and in the mouth. If it grows out of control, it can cause an infection. Candida can cause infections in the genital area, skin folds, in the mouth, and under the breasts. Anyone can get this infection. It is more common in a person with a weak immune system, such as from diabetes, HIV, or cancer. It s also more common in someone who has been on antibiotic therapy. And it s more common people who are overweight or who have incontinence. Wearing tight-fitting clothing and taking part in activities with lots of skin-to-skin contact can also put you at risk.  Candida causes the skin to become bright red and inflamed. The border of the infected part of the skin is often raised. The infection causes pain and itching. Sometimes the skin peels and bleeds. In the mouth, candida is called thrush, and may cause white thickened areas.  A Candida rash is most often treated with an antifungal cream or ointment. The rash will clear a few days after starting the medicine. Infections that don t go away may need a prescription medicine. In rare cases, a bacterial infection can also occur.  Home care  Your healthcare provider will recommend an antifungal cream or ointment for the rash. He or she may also prescribe a medicine for the itch. Follow all instructions for using these medicines. Don t use cornstarch powder. Cornstarch can cause the Candida infection to get worse.  General care:    Keep your skin clean by washing the area twice a day.    Use the cream as directed until your rash is gone. Once the skin has healed, keep it dry to prevent another infection.     If you are overweight, talk with your healthcare provider about a plan to lose excess weight.    Avoid clothes that fit tightly.  Follow-up care  Follow up with your healthcare provider, or as advised. Your rash will clear in 7 to 14 days. Call your healthcare provider if the rash  is not gone after 14 days.  When to seek medical advice  Call your healthcare provider right away if any of these occur:    Pain or redness that gets worse or spreads    Fluid coming from the skin    Yellow crusts on the skin    Fever of 100.4 F (38 C) or higher, or as directed by your healthcare provider  Date Last Reviewed: 9/1/2016 2000-2017 [employer]. [address] [place] [address]. All rights reserved. This information is not intended as a substitute for professional medical care. Always follow your healthcare professional's instructions.

## 2018-04-05 NOTE — MR AVS SNAPSHOT
After Visit Summary   4/5/2018    Fabiana Sanders    MRN: [id number]           Patient Information     Date Of Birth          1970        Visit Information        Provider Department      4/5/2018 10:00 AM Fabiana Lantigua NP [facility]        Today's Diagnoses     Yeast dermatitis    -  1      Care Instructions      Candida Skin Infection (Adult)  Candida is type of yeast. It grows naturally on the skin and in the mouth. If it grows out of control, it can cause an infection. Candida can cause infections in the genital area, skin folds, in the mouth, and under the breasts. Anyone can get this infection. It is more common in a person with a weak immune system, such as from diabetes, HIV, or cancer. It s also more common in someone who has been on antibiotic therapy. And it s more common people who are overweight or who have incontinence. Wearing tight-fitting clothing and taking part in activities with lots of skin-to-skin contact can also put you at risk.  Candida causes the skin to become bright red and inflamed. The border of the infected part of the skin is often raised. The infection causes pain and itching. Sometimes the skin peels and bleeds. In the mouth, candida is called thrush, and may cause white thickened areas.  A Candida rash is most often treated with an antifungal cream or ointment. The rash will clear a few days after starting the medicine. Infections that don t go away may need a prescription medicine. In rare cases, a bacterial infection can also occur.  Home care  Your healthcare provider will recommend an antifungal cream or ointment for the rash. He or she may also prescribe a medicine for the itch. Follow all instructions for using these medicines. Don t use cornstarch powder. Cornstarch can cause the Candida infection to get worse.  General care:    Keep your skin clean by washing the area twice a day.    Use the cream as directed until your rash is  gone. Once the skin has healed, keep it dry to prevent another infection.     If you are overweight, talk with your healthcare provider about a plan to lose excess weight.    Avoid clothes that fit tightly.  Follow-up care  Follow up with your healthcare provider, or as advised. Your rash will clear in 7 to 14 days. Call your healthcare provider if the rash is not gone after 14 days.  When to seek medical advice  Call your healthcare provider right away if any of these occur:    Pain or redness that gets worse or spreads    Fluid coming from the skin    Yellow crusts on the skin    Fever of 100.4 F (38 C) or higher, or as directed by your healthcare provider  Date Last Reviewed: 9/1/2016 2000-2017 [employer]. [address] [place] [address], [place] [address]. All rights reserved. This information is not intended as a substitute for professional medical care. Always follow your healthcare professional's instructions.                Follow-ups after your visit        Your next 10 appointments already scheduled     Jun 20, 2018  9:00 AM CDT   [facility] Visit with [facility] MD   [facility]    [facility] [email or web address]  [place] [id number] [phone]           Bring a current list of meds and any records pertaining to this visit. For Physicals, please bring immunization records and any forms needing to be filled out. Please arrive 10 minutes early to complete paperwork.              Who to contact     If you have questions or need follow up information about today's clinic visit or your schedule please contact [facility] directly at [phone].  Normal or non-critical lab and imaging results will be communicated to you by MyChart, letter or phone within 4 business days after the clinic has received the results. If you do not hear from us within 7 days, please contact the clinic through MyChart or phone. If you have a critical  "or abnormal lab result, we will notify you by phone as soon as possible.  Submit refill requests through [employer] or call your pharmacy and they will forward the refill request to us. Please allow 3 business days for your refill to be completed.          Additional Information About Your Visit        "[employer]hart Information     [employer] lets you send messages to your doctor, view your test results, renew your prescriptions, schedule appointments and more. To sign up, go to [email or web address] . Click on \"Log in\" on the left side of the screen, which will take you to the Welcome page. Then click on \"Sign up Now\" on the right side of the page.     You will be asked to enter the access code listed below, as well as some personal information. Please follow the directions to create your username and password.     Your access code is: [email or web address]  Expires: 2018 10:36 AM     Your access code will  in 90 days. If you need help or a new code, please call your [place] clinic or [phone].        Care EveryWhere ID     This is your Care EveryWhere ID. This could be used by other organizations to access your [place] medical records  [id number]        Your Vitals Were     Pulse Temperature Respirations Height Pulse Oximetry Breastfeeding?    122 97.3  F (36.3  C) (Tympanic) 18 5' 4\" (1.626 m) 94% No    BMI (Body Mass Index)                   40.56 kg/m2            Blood Pressure from Last 3 Encounters:   18 128/76   17 122/68   17 118/66    Weight from Last 3 Encounters:   18 236 lb 4.8 oz (107.2 kg)   17 232 lb 3.2 oz (105.3 kg)   17 230 lb (104.3 kg)              Today, you had the following     No orders found for display         Today's Medication Changes          These changes are accurate as of 18 10:36 AM.  If you have any questions, ask your nurse or doctor.               Start taking these medicines.        Dose/Directions    * nystatin cream   Commonly known as:  " MYCOSTATIN   Used for:  Yeast dermatitis   Started by:  Fabiana Lantigua NP        Apply topically 3 times daily for 7 days   Quantity:  30 g   Refills:  0       * nystatin 383251 UNIT/GM Powd   Commonly known as:  MYCOSTATIN   Used for:  Yeast dermatitis   Started by:  Fabiana Lantigua NP        Apply topically 3 times daily as needed   Quantity:  30 g   Refills:  0       * Notice:  This list has 2 medication(s) that are the same as other medications prescribed for you. Read the directions carefully, and ask your doctor or other care provider to review them with you.         Where to get your medicines      These medications were sent to [facility], - [place], [place] - [address]  [address] [place] [address]     Phone:  [phone]     nystatin 707250 UNIT/GM Powd    nystatin cream                Primary Care Provider Office Phone # Fax #    Radha Humphrey [phone]       [address] [facility] [address]        Equal Access to Services     [facility] AH: Hadii aad ku hadasho Soomaali, waaxda luqadaha, qaybta kaalmada adeegyada, waxay idiin haycandida mcdermott . So [facility] [phone].    ATENCIÓN: Si habla español, tiene a georges disposición servicios gratuitos de asistencia lingüística. [facility] [phone].    We comply with applicable federal civil rights laws and [place] laws. We do not discriminate on the basis of race, color, national origin, age, disability, sex, sexual orientation, or gender identity.            Thank you!     Thank you for choosing [facility]  for your care. Our goal is always to provide you with excellent care. Hearing back from our patients is one way we can continue to improve our services. Please take a few minutes to complete the written survey that you may receive in the mail after your visit with us. Thank you!             Your Updated Medication List - Protect others around  you: Learn how to safely use, store and throw away your medicines at www.disposemymeds.org.          This list is accurate as of 4/5/18 10:36 AM.  Always use your most recent med list.                   Brand Name Dispense Instructions for use Diagnosis    acetaminophen 500 MG tablet    TYLENOL     Take 1,000 mg by mouth every 6 hours as needed Max acetaminophen dose: 4000 mg in 24 hours        atorvastatin 40 MG tablet    LIPITOR     Take 40 mg by mouth daily        cyclobenzaprine 10 MG tablet    FLEXERIL    90 tablet    Take 1 tablet (10 mg) by mouth 3 times daily as needed    Fibromyalgia       meloxicam 15 MG tablet    MOBIC     15 mg, 1 tab BID PRN        * nystatin cream    MYCOSTATIN    30 g    Apply topically 3 times daily for 7 days    Yeast dermatitis       * nystatin 592622 UNIT/GM Powd    MYCOSTATIN    30 g    Apply topically 3 times daily as needed    Yeast dermatitis       traMADol 50 MG tablet    ULTRAM     Take 100 mg by mouth 4 times daily        traZODone 50 MG tablet    DESYREL     50 mg 1-2 tabs at HS        venlafaxine 75 MG 24 hr capsule    EFFEXOR-XR     Take 75 mg by mouth daily with food        * Notice:  This list has 2 medication(s) that are the same as other medications prescribed for you. Read the directions carefully, and ask your doctor or other care provider to review them with you.

## 2018-04-10 NOTE — TELEPHONE ENCOUNTER
It looks like this was loaded into the system based on her report. Please find out what DOSE she is currently on. It's possible that she was previously on 2 of the 37.5 mg tabs and we just changed it to 1 of the 75 mg tabs. It may be helpful to talk to her pharmacy. Please update our med list when accurate info is obtained and set up for refill. AET

## 2018-04-11 PROBLEM — F43.23 ADJUSTMENT DISORDER WITH MIXED ANXIETY AND DEPRESSED MOOD: Status: ACTIVE | Noted: 2018-04-11

## 2018-04-11 RX ORDER — VENLAFAXINE HYDROCHLORIDE 75 MG/1
75 CAPSULE, EXTENDED RELEASE ORAL 2 TIMES DAILY
Qty: 180 CAPSULE | Refills: 3 | Status: SHIPPED | OUTPATIENT
Start: 2018-04-11 | End: 2018-04-11

## 2018-04-11 RX ORDER — VENLAFAXINE HYDROCHLORIDE 150 MG/1
150 CAPSULE, EXTENDED RELEASE ORAL DAILY
Qty: 90 CAPSULE | Refills: 1 | Status: SHIPPED | OUTPATIENT
Start: 2018-04-11 | End: 2018-10-10

## 2018-04-11 NOTE — TELEPHONE ENCOUNTER
Patient notified and she doesn't mean taking 2 at the same time. Would like the the next fill to be as 150 mg tablet.   Lilli Esqueda LPN ..........4/11/2018 2:49 PM

## 2018-04-11 NOTE — TELEPHONE ENCOUNTER
This is a long acting medication, so likely no benefit by dosing twice daily. If she would like to try taking both tabs once daily, then we can switch to one 150mg tab in the morning. For now, I will fill as she has been taking and she can just let me know. AET

## 2018-04-11 NOTE — TELEPHONE ENCOUNTER
The phone # [phone] is not a correct phone number for this Patient. Called other [phone] number and Left message to call back.   Lilli Esqueda LPN ..........4/11/2018 2:46 PM

## 2018-06-12 DIAGNOSIS — M79.7 FIBROMYALGIA: Primary | ICD-10-CM

## 2018-06-13 PROBLEM — F32.A DEPRESSION: Status: ACTIVE | Noted: 2017-10-06

## 2018-06-13 PROBLEM — M19.90 ARTHRITIS: Status: ACTIVE | Noted: 2017-09-28

## 2018-06-14 NOTE — TELEPHONE ENCOUNTER
Tramadol  Last Written Prescription Date:  12/27/2017  Last Fill Quantity: 240,   # refills: 5  Last Office Visit: 12/27/2017  Future Office visit:    Next 5 appointments (look out 90 days)     Jun 20, 2018  9:00 AM CDT   Office Visit with Radha Humphrey MD   [facility] ([facility])    [address]  [facility] [address]   [phone]                   Routing refill request to provider for review/approval because:  Drug not on the [facility], [facility] or [facility] refill protocol or controlled substance      Unable to complete prescription refill per RNMedication Refill Policy.................... Basilia Warren ....................  6/14/2018   2:36 PM

## 2018-06-20 RX ORDER — TRAMADOL HYDROCHLORIDE 50 MG/1
TABLET ORAL
Qty: 240 TABLET | Refills: 5 | Status: SHIPPED | OUTPATIENT
Start: 2018-06-20 | End: 2018-11-09

## 2018-07-23 NOTE — PROGRESS NOTES
Patient Information     Patient Name  Fabiana Sanders MRN  [id number] Sex  Female   1970      Letter by Radha Humphrey MD at      Author:  Radha Humphrey MD Service:  (none) Author Type:  (none)    Filed:   Encounter Date:  2017 Status:  (Other)         [employer] [facility]  [address] [address] [address] [place] [address]    2017    Fabiana Sanders  [address] [facility] [address]    Dear Ms. Sanders    We have received your [employer] application. However, additional information is required before we can complete the process. The following item(s) are missing or incomplete:    Email not listed in your medical record.    Demographic information in your health record, must be updated by your primary care clinic or by calling [employer] at [phone].    You will need to complete and submit a new application with all required information. Please go to [email or web address] to:    print new application form(s) by clicking on Sign up now    sign up online for an account for yourself.    For questions or technical assistance, call [phone].    Thank you for choosing [employer]!

## 2018-07-23 NOTE — PROGRESS NOTES
Patient Information     Patient Name  Fabiana Sanders MRN  [id number] Sex  Female   1970      Letter by Radha Mejia MD at      Author:  Radha Mejia MD Service:  (none) Author Type:  (none)    Filed:   Encounter Date:  2017 Status:  (Other)           Fabiana Sanders  [address] [facility] [address]          2017    Dear Ms. Sanders:    Please see the copy of your lab results below:    Resulted Orders      LIPID PANEL (Collected: 2017 10:15 AM)      Result  Value Ref Range    CHOLESTEROL,TOTAL 171 <200 mg/dL    TRIGLYCERIDES 388 (H) <150 mg/dL    HDL CHOLESTEROL 39 23 - 92 mg/dL    NON-HDL CHOLESTEROL 132 <145 mg/dl    CHOL/HDL RATIO            4.38 <4.50                    LDL CHOLESTEROL 54 <100 mg/dL    PROVIDER ORDERED STATUS FASTING      In general, your cholesterol looks better. But your triglycerides are still very high. I would recommend that you double your Lipitor dose. I will send a new prescription, but you can take two of your current supply.     Please don't hesitate to call if you have concerns or questions.       Sincerely,       RADHA MEJIA MD

## 2018-07-23 NOTE — PROGRESS NOTES
Patient Information     Patient Name  Fabiana Sanders MRN  [id number] Sex  Female   1970      Letter by Baron Zimmer at      Author:  Baron Zimmer Service:  (none) Author Type:  (none)    Filed:   Encounter Date:  10/31/2017 Status:  (Other)         [employer] [facility]  [address] [address] [address] [place] [address]    2017    Fabiana Sanders  [address] [facility] [address]    Dear Ms. Sanders    We have received your [employer] application. However, additional information is required before we can complete the process. The following item(s) are missing or incomplete:    Missing last 4 digits of Social Security Number for each member you are requesting access.  Your medical record does not contain her e-mail address.    Demographic information in your health record, must be updated by your primary care clinic or by calling [employer] at [phone].    You will need to complete and submit a new application with all required information. Please go to [email or web address] to:    print new application form(s) by clicking on Sign up now    sign up online for an account for yourself.    For questions or technical assistance, call [phone].    Thank you for choosing [employer]!

## 2018-09-11 ENCOUNTER — MYC REFILL (OUTPATIENT)
Dept: FAMILY MEDICINE | Facility: OTHER | Age: 48
End: 2018-09-11

## 2018-09-11 DIAGNOSIS — M79.7 FIBROMYALGIA: ICD-10-CM

## 2018-09-12 RX ORDER — CYCLOBENZAPRINE HCL 10 MG
10 TABLET ORAL 3 TIMES DAILY PRN
Qty: 90 TABLET | Refills: 5 | Status: SHIPPED | OUTPATIENT
Start: 2018-09-12 | End: 2019-02-26

## 2018-09-12 NOTE — TELEPHONE ENCOUNTER
Message from [employer]:  Original authorizing provider: MD Fabiana Atwood would like a refill of the following medications:  cyclobenzaprine (FLEXERIL) 10 MG tablet [Radha Humphrey MD]    Preferred pharmacy: [facility], - [place] - [address]    Comment:

## 2018-10-10 DIAGNOSIS — F43.23 ADJUSTMENT DISORDER WITH MIXED ANXIETY AND DEPRESSED MOOD: ICD-10-CM

## 2018-10-10 NOTE — LETTER
October 12, 2018      Fabiana Sanders  [address] [facility] [place] [facility] [address]        Dear Fabiana,     This is to remind you that you are due for a follow up office visit with Radha Humphrey MD.  Your last visit was on 12/27/2017.     Additional refills of your medication require you to complete this visit.    Please call [phone] to schedule your appointment.    Thank you for choosing [facility] for your health care needs.    Sincerely,      Refill RN  [facility]

## 2018-10-12 RX ORDER — VENLAFAXINE HYDROCHLORIDE 150 MG/1
CAPSULE, EXTENDED RELEASE ORAL
Qty: 90 CAPSULE | Refills: 0 | Status: SHIPPED | OUTPATIENT
Start: 2018-10-12 | End: 2018-11-09

## 2018-10-12 NOTE — TELEPHONE ENCOUNTER
Effexor   LOV-12/27/2017-Patient will follow-up in 6 months, sooner with concerns.  Due for exam.  Limited refill per protocol and letter mailed.  Basilia Warren ........   10/12/2018    12:13 PM

## 2018-11-05 DIAGNOSIS — M79.7 FIBROMYALGIA: ICD-10-CM

## 2018-11-07 RX ORDER — TRAMADOL HYDROCHLORIDE 50 MG/1
TABLET ORAL
Qty: 240 TABLET | OUTPATIENT
Start: 2018-11-07

## 2018-11-09 ENCOUNTER — OFFICE VISIT (OUTPATIENT)
Dept: FAMILY MEDICINE | Facility: OTHER | Age: 48
End: 2018-11-09
Attending: FAMILY MEDICINE
Payer: COMMERCIAL

## 2018-11-09 VITALS
SYSTOLIC BLOOD PRESSURE: 108 MMHG | WEIGHT: 239.4 LBS | HEART RATE: 100 BPM | DIASTOLIC BLOOD PRESSURE: 74 MMHG | RESPIRATION RATE: 18 BRPM | BODY MASS INDEX: 41.09 KG/M2 | TEMPERATURE: 97.9 F

## 2018-11-09 DIAGNOSIS — Z00.00 HEALTH CARE MAINTENANCE: ICD-10-CM

## 2018-11-09 DIAGNOSIS — F43.23 ADJUSTMENT DISORDER WITH MIXED ANXIETY AND DEPRESSED MOOD: ICD-10-CM

## 2018-11-09 DIAGNOSIS — M79.7 FIBROMYALGIA: ICD-10-CM

## 2018-11-09 DIAGNOSIS — E78.49 OTHER HYPERLIPIDEMIA: ICD-10-CM

## 2018-11-09 DIAGNOSIS — M54.16 LUMBAR BACK PAIN WITH RADICULOPATHY AFFECTING LEFT LOWER EXTREMITY: Primary | ICD-10-CM

## 2018-11-09 LAB
ALBUMIN SERPL-MCNC: 3.7 G/DL (ref 3.5–5.7)
ALP SERPL-CCNC: 129 U/L (ref 34–104)
ALT SERPL W P-5'-P-CCNC: 13 U/L (ref 7–52)
ANION GAP SERPL CALCULATED.3IONS-SCNC: 7 MMOL/L (ref 3–14)
AST SERPL W P-5'-P-CCNC: 14 U/L (ref 13–39)
BILIRUB SERPL-MCNC: 0.3 MG/DL (ref 0.3–1)
BUN SERPL-MCNC: 9 MG/DL (ref 7–25)
CALCIUM SERPL-MCNC: 9.3 MG/DL (ref 8.6–10.3)
CHLORIDE SERPL-SCNC: 102 MMOL/L (ref 98–107)
CHOLEST SERPL-MCNC: 177 MG/DL
CO2 SERPL-SCNC: 27 MMOL/L (ref 21–31)
CREAT SERPL-MCNC: 0.99 MG/DL (ref 0.6–1.2)
GFR SERPL CREATININE-BSD FRML MDRD: 60 ML/MIN/1.7M2
GLUCOSE SERPL-MCNC: 83 MG/DL (ref 70–105)
HBA1C MFR BLD: 5.4 % (ref 4–6)
HDLC SERPL-MCNC: 37 MG/DL (ref 23–92)
LDLC SERPL CALC-MCNC: ABNORMAL MG/DL
NONHDLC SERPL-MCNC: 140 MG/DL
POTASSIUM SERPL-SCNC: 4 MMOL/L (ref 3.5–5.1)
PROT SERPL-MCNC: 6.3 G/DL (ref 6.4–8.9)
SODIUM SERPL-SCNC: 136 MMOL/L (ref 134–144)
TRIGL SERPL-MCNC: 628 MG/DL

## 2018-11-09 PROCEDURE — 99214 OFFICE O/P EST MOD 30 MIN: CPT | Performed by: FAMILY MEDICINE

## 2018-11-09 PROCEDURE — 83036 HEMOGLOBIN GLYCOSYLATED A1C: CPT | Performed by: FAMILY MEDICINE

## 2018-11-09 PROCEDURE — G0463 HOSPITAL OUTPT CLINIC VISIT: HCPCS

## 2018-11-09 PROCEDURE — 80061 LIPID PANEL: CPT | Performed by: FAMILY MEDICINE

## 2018-11-09 PROCEDURE — 36415 COLL VENOUS BLD VENIPUNCTURE: CPT | Performed by: FAMILY MEDICINE

## 2018-11-09 PROCEDURE — 80053 COMPREHEN METABOLIC PANEL: CPT | Performed by: FAMILY MEDICINE

## 2018-11-09 RX ORDER — ATORVASTATIN CALCIUM 40 MG/1
40 TABLET, FILM COATED ORAL DAILY
Qty: 90 TABLET | Refills: 3 | Status: SHIPPED | OUTPATIENT
Start: 2018-11-09 | End: 2019-11-06

## 2018-11-09 RX ORDER — VENLAFAXINE HYDROCHLORIDE 150 MG/1
CAPSULE, EXTENDED RELEASE ORAL
Qty: 90 CAPSULE | Refills: 3 | Status: SHIPPED | OUTPATIENT
Start: 2018-11-09 | End: 2019-11-06

## 2018-11-09 RX ORDER — TRAMADOL HYDROCHLORIDE 50 MG/1
TABLET ORAL
Qty: 240 TABLET | Refills: 5 | Status: SHIPPED | OUTPATIENT
Start: 2018-11-09 | End: 2019-04-26

## 2018-11-09 ASSESSMENT — ANXIETY QUESTIONNAIRES
1. FEELING NERVOUS, ANXIOUS, OR ON EDGE: MORE THAN HALF THE DAYS
2. NOT BEING ABLE TO STOP OR CONTROL WORRYING: MORE THAN HALF THE DAYS
5. BEING SO RESTLESS THAT IT IS HARD TO SIT STILL: SEVERAL DAYS
3. WORRYING TOO MUCH ABOUT DIFFERENT THINGS: SEVERAL DAYS
6. BECOMING EASILY ANNOYED OR IRRITABLE: NEARLY EVERY DAY
IF YOU CHECKED OFF ANY PROBLEMS ON THIS QUESTIONNAIRE, HOW DIFFICULT HAVE THESE PROBLEMS MADE IT FOR YOU TO DO YOUR WORK, TAKE CARE OF THINGS AT HOME, OR GET ALONG WITH OTHER PEOPLE: SOMEWHAT DIFFICULT
GAD7 TOTAL SCORE: 12
7. FEELING AFRAID AS IF SOMETHING AWFUL MIGHT HAPPEN: MORE THAN HALF THE DAYS

## 2018-11-09 ASSESSMENT — PAIN SCALES - GENERAL: PAINLEVEL: MILD PAIN (3)

## 2018-11-09 ASSESSMENT — PATIENT HEALTH QUESTIONNAIRE - PHQ9
5. POOR APPETITE OR OVEREATING: SEVERAL DAYS
SUM OF ALL RESPONSES TO PHQ QUESTIONS 1-9: 15

## 2018-11-09 NOTE — MR AVS SNAPSHOT
After Visit Summary   11/9/2018    Fabiana Sanders    MRN: [id number]           Patient Information     Date Of Birth          1970        Visit Information        Provider Department      11/9/2018 8:30 AM Radha Humphrey MD [facility]        Today's Diagnoses     Lumbar back pain with radiculopathy affecting left lower extremity    -  1    Fibromyalgia        Adjustment disorder with mixed anxiety and depressed mood        Other hyperlipidemia        Health care maintenance           Follow-ups after your visit        Additional Services     CHIROPRACTIC REFERRAL       Rufina More            PHYSICAL THERAPY REFERRAL       Mane Wolfe                  Future tests that were ordered for you today     Open Future Orders        Priority Expected Expires Ordered    PHYSICAL THERAPY REFERRAL Routine  11/9/2019 11/9/2018    MA Screen Bilateral w/Natan Routine  11/9/2019 11/9/2018            Who to contact     If you have questions or need follow up information about today's clinic visit or your schedule please contact [facility] directly at [phone].  Normal or non-critical lab and imaging results will be communicated to you by [employer], letter or phone within 4 business days after the clinic has received the results. If you do not hear from us within 7 days, please contact the clinic through [employer] or phone. If you have a critical or abnormal lab result, we will notify you by phone as soon as possible.  Submit refill requests through [employer] or call your pharmacy and they will forward the refill request to us. Please allow 3 business days for your refill to be completed.          Additional Information About Your Visit        MyChart Information     [employer] gives you secure access to your electronic health record. If you see a primary care provider, you can also send messages to your care team and make appointments. If you have questions, please  call your primary care clinic.  If you do not have a primary care provider, please call [phone] and they will assist you.        Care EveryWhere ID     This is your Care EveryWhere ID. This could be used by other organizations to access your [facility] medical records  [id number]        Your Vitals Were     Pulse Temperature Respirations BMI (Body Mass Index)          100 97.9  F (36.6  C) (Tympanic) 18 41.09 kg/m2         Blood Pressure from Last 3 Encounters:   11/09/18 108/74   04/05/18 128/76   12/27/17 122/68    Weight from Last 3 Encounters:   11/09/18 239 lb 6.4 oz (108.6 kg)   04/05/18 236 lb 4.8 oz (107.2 kg)   12/27/17 232 lb 3.2 oz (105.3 kg)              We Performed the Following     CHIROPRACTIC REFERRAL     Comprehensive Metabolic Panel     Hemoglobin A1c     Lipid Panel          Today's Medication Changes          These changes are accurate as of 11/9/18  9:36 AM.  If you have any questions, ask your nurse or doctor.               Stop taking these medicines if you haven't already. Please contact your care team if you have questions.     meloxicam 15 MG tablet   Commonly known as:  MOBIC   Stopped by:  Radha Humphrey MD                Where to get your medicines      These medications were sent to [facility], - [place], [place] - [address]  [address], [place] [address]     Phone:  [phone]     atorvastatin 40 MG tablet    venlafaxine 150 MG 24 hr capsule         Some of these will need a paper prescription and others can be bought over the counter.  Ask your nurse if you have questions.     Bring a paper prescription for each of these medications     traMADol 50 MG tablet               Information about OPIOIDS     PRESCRIPTION OPIOIDS: WHAT YOU NEED TO KNOW   We gave you an opioid (narcotic) pain medicine. It is important to manage your pain, but opioids are not always the best choice. You should first try all the other options your care team  gave you. Take this medicine for as short a time (and as few doses) as possible.    Some activities can increase your pain, such as bandage changes or therapy sessions. It may help to take your pain medicine 30 to 60 minutes before these activities. Reduce your stress by getting enough sleep, working on hobbies you enjoy and practicing relaxation or meditation. Talk to your care team about ways to manage your pain beyond prescription opioids.    These medicines have risks:    DO NOT drive when on new or higher doses of pain medicine. These medicines can affect your alertness and reaction times, and you could be arrested for driving under the influence (DUI). If you need to use opioids long-term, talk to your care team about driving.    DO NOT operate heavy machinery    DO NOT do any other dangerous activities while taking these medicines.    DO NOT drink any alcohol while taking these medicines.     If the opioid prescribed includes acetaminophen, DO NOT take with any other medicines that contain acetaminophen. Read all labels carefully. Look for the word  acetaminophen  or  Tylenol.  Ask your pharmacist if you have questions or are unsure.    You can get addicted to pain medicines, especially if you have a history of addiction (chemical, alcohol or substance dependence). Talk to your care team about ways to reduce this risk.    All opioids tend to cause constipation. Drink plenty of water and eat foods that have a lot of fiber, such as fruits, vegetables, prune juice, apple juice and high-fiber cereal. Take a laxative (Miralax, milk of magnesia, Colace, Senna) if you don t move your bowels at least every other day. Other side effects include upset stomach, sleepiness, dizziness, throwing up, tolerance (needing more of the medicine to have the same effect), physical dependence and slowed breathing.    Store your pills in a secure place, locked if possible. We will not replace any lost or stolen medicine. If you  don t finish your medicine, please throw away (dispose) as directed by your pharmacist. The [place] Pollution [employer] has more information about safe disposal: https://www.pca.[facility].us/living-green/managing-unwanted-medications         Primary Care Provider Office Phone # Fax #    Radha Humphrey [phone]       [address]   [place] [address]        Equal Access to Services     EDEL BARONE : Hadii aad ku hadasho Soomaali, waaxda luqadaha, qaybta kaalmada adeegyada, waxay idiin hayaan adeeg bandararash lataylor . So [facility] [phone].    ATENCIÓN: Si acosta stephens, tiene a georges disposición servicios gratuitos de asistencia lingüística. Llame al [phone].    We comply with applicable federal civil rights laws and [place] laws. We do not discriminate on the basis of race, color, national origin, age, disability, sex, sexual orientation, or gender identity.            Thank you!     Thank you for choosing [facility] AND [facility]  for your care. Our goal is always to provide you with excellent care. Hearing back from our patients is one way we can continue to improve our services. Please take a few minutes to complete the written survey that you may receive in the mail after your visit with us. Thank you!             Your Updated Medication List - Protect others around you: Learn how to safely use, store and throw away your medicines at www.disposemymeds.org.          This list is accurate as of 11/9/18  9:36 AM.  Always use your most recent med list.                   Brand Name Dispense Instructions for use Diagnosis    acetaminophen 500 MG tablet    TYLENOL     Take 1,000 mg by mouth every 6 hours as needed Max acetaminophen dose: 4000 mg in 24 hours        atorvastatin 40 MG tablet    LIPITOR    90 tablet    Take 1 tablet (40 mg) by mouth daily    Other hyperlipidemia       cyclobenzaprine 10 MG tablet    FLEXERIL    90 tablet    Take 1 tablet (10 mg) by mouth 3  times daily as needed    Fibromyalgia       traMADol 50 MG tablet    ULTRAM    240 tablet    TAKE 2 TABLETS BY MOUTH 4 TIMES DAILY    Fibromyalgia       traZODone 50 MG tablet    DESYREL     50 mg 1-2 tabs at HS        venlafaxine 150 MG 24 hr capsule    EFFEXOR-XR    90 capsule    Take 1 capsule (150 mg) by mouth daily    Adjustment disorder with mixed anxiety and depressed mood

## 2018-11-09 NOTE — PROGRESS NOTES
SUBJECTIVE:   Fabiana Sanders is a 48 year old female who presents to clinic today for the following health issues:    HPI Comments: Patient presents for medication refills.   In the past month, she has had increased back pain with pain radiating down her L leg.  Pain is the worst when she stands and walks.   Notes weakness and pain. Shooting pain causes burning.       Patient Active Problem List    Diagnosis Date Noted     Adjustment disorder with mixed anxiety and depressed mood 04/11/2018     Priority: Medium     Chronic midline low back pain without sciatica 11/27/2017     Priority: Medium     Fibromyalgia 11/27/2017     Priority: Medium     S/P carpal tunnel release 11/08/2017     Priority: Medium     Other hyperlipidemia 10/23/2017     Priority: Medium     Bilateral carpal tunnel syndrome 10/16/2017     Priority: Medium     Depression 10/06/2017     Priority: Medium     Arthritis 09/28/2017     Priority: Medium         Review of Systems see HPI, ROS otherwise neg      OBJECTIVE:     /74 (BP Location: Right arm, Patient Position: Chair, Cuff Size: Adult Large)  Pulse 100  Temp 97.9  F (36.6  C) (Tympanic)  Resp 18  Wt 239 lb 6.4 oz (108.6 kg)  BMI 41.09 kg/m2  Body mass index is 41.09 kg/(m^2).  Physical Exam   Constitutional: She is oriented to person, place, and time. She appears well-developed and well-nourished.   HENT:   TMs appear normal.    Eyes: Conjunctivae are normal.   Neck: No thyromegaly present.   Cardiovascular: Normal rate, regular rhythm and normal heart sounds.    No murmur heard.  Pulmonary/Chest: Effort normal and breath sounds normal. No respiratory distress.   Abdominal: Soft.   Musculoskeletal: She exhibits no edema.   Lymphadenopathy:     She has no cervical adenopathy.   Neurological: She is alert and oriented to person, place, and time.   Skin: No rash noted.   Psychiatric: She has a normal mood and affect.       ASSESSMENT/PLAN:         ICD-10-CM    1. Lumbar back pain  with radiculopathy affecting left lower extremity M54.16 CHIROPRACTIC REFERRAL     PHYSICAL THERAPY REFERRAL   2. Fibromyalgia M79.7 traMADol (ULTRAM) 50 MG tablet   3. Adjustment disorder with mixed anxiety and depressed mood F43.23 venlafaxine (EFFEXOR-XR) 150 MG 24 hr capsule   4. Other hyperlipidemia E78.49 atorvastatin (LIPITOR) 40 MG tablet     Lipid Panel     Comprehensive Metabolic Panel     Hemoglobin A1c     Lipid Panel     Comprehensive Metabolic Panel     Hemoglobin A1c   5. Health care maintenance Z00.00 MA Screen Bilateral w/Natan     Medications reviewed and refilled.   Labs today.   Referred to physical therapy and chiro for back pain. Reviewed previous MRI.   Vaccines up to date.  Mammogram scheduled.  Paps no longer needed.     Relevant cancer screening discussed.    Counseled on healthy diet, Calcium and vitamin D intake, and exercise.    A total of 25 minutes was spent withthis patient, of which more than 50% was spent in counseling and/or coordination of care regarding above issues.       Radha Humphrey MD  [facility] AND [facility]

## 2018-11-10 ASSESSMENT — ANXIETY QUESTIONNAIRES: GAD7 TOTAL SCORE: 12

## 2018-11-12 ENCOUNTER — MYC MEDICAL ADVICE (OUTPATIENT)
Dept: FAMILY MEDICINE | Facility: OTHER | Age: 48
End: 2018-11-12

## 2018-11-12 DIAGNOSIS — E78.1 HYPERTRIGLYCERIDEMIA: Primary | ICD-10-CM

## 2018-12-03 DIAGNOSIS — M79.7 FIBROMYALGIA: Primary | ICD-10-CM

## 2018-12-04 RX ORDER — TRAZODONE HYDROCHLORIDE 50 MG/1
TABLET, FILM COATED ORAL
Qty: 90 TABLET | Refills: 1 | Status: SHIPPED | OUTPATIENT
Start: 2018-12-04 | End: 2019-04-26

## 2018-12-04 NOTE — TELEPHONE ENCOUNTER
"Requested Prescriptions   Pending Prescriptions Disp Refills     traZODone (DESYREL) 50 MG tablet [Pharmacy Med Name: TRAZODONE HCL 50 MG TABLET] 90 tablet      Sig: TAKE 1 TO 2 TABLETS BY MOUTH AT BEDTIME    Serotonin Modulators Passed    12/3/2018  8:09 AM       Passed - Recent (12 mo) or future (30 days) visit within the authorizing provider's specialty    Patient had office visit in the last 12 months or has a visit in the next 30 days with authorizing provider or within the authorizing provider's specialty.  See \"Patient Info\" tab in inbasket, or \"Choose Columns\" in Meds & Orders section of the refill encounter.             Passed - Patient is age 18 or older       Passed - No active pregnancy on record       Passed - No positive pregnancy test in past 12 months        LOV-11/09/2018    Prescription refilled per RN Medication RefillPolliliany.................... Basilia Warren ....................  12/4/2018   2:52 PM        "

## 2018-12-26 ENCOUNTER — OFFICE VISIT (OUTPATIENT)
Dept: FAMILY MEDICINE | Facility: OTHER | Age: 48
End: 2018-12-26
Attending: PHYSICIAN ASSISTANT
Payer: COMMERCIAL

## 2018-12-26 VITALS
SYSTOLIC BLOOD PRESSURE: 112 MMHG | DIASTOLIC BLOOD PRESSURE: 70 MMHG | WEIGHT: 229.6 LBS | HEIGHT: 63 IN | HEART RATE: 106 BPM | BODY MASS INDEX: 40.68 KG/M2 | RESPIRATION RATE: 28 BRPM | TEMPERATURE: 98.4 F

## 2018-12-26 DIAGNOSIS — J01.00 ACUTE NON-RECURRENT MAXILLARY SINUSITIS: Primary | ICD-10-CM

## 2018-12-26 PROCEDURE — G0463 HOSPITAL OUTPT CLINIC VISIT: HCPCS

## 2018-12-26 PROCEDURE — 99214 OFFICE O/P EST MOD 30 MIN: CPT | Performed by: PHYSICIAN ASSISTANT

## 2018-12-26 RX ORDER — PREDNISONE 20 MG/1
40 TABLET ORAL DAILY
Qty: 10 TABLET | Refills: 0 | Status: SHIPPED | OUTPATIENT
Start: 2018-12-26 | End: 2018-12-31

## 2018-12-26 ASSESSMENT — MIFFLIN-ST. JEOR: SCORE: 1645.59

## 2018-12-26 ASSESSMENT — PAIN SCALES - GENERAL: PAINLEVEL: MODERATE PAIN (5)

## 2018-12-26 NOTE — NURSING NOTE
Patient in clinic for headache and sinus and ear pressure x 2 days. Treating with advil cold and sinus without relief.  Jolly Hernandez LPN....................  12/26/2018   12:28 PM    Chief Complaint   Patient presents with     Headache     Sinus Problem       Medication Reconciliation: complete    Jolly Hernandez LPN

## 2018-12-26 NOTE — PROGRESS NOTES
"SUBJECTIVE: Fabiana Sanders is a 48 year old female patient complaining of sinus pressure, congestion, frontal headache, right ear pain  Onset 2 days ago, course is worsening, she has a dizzy feeling  Associated symptoms: no cough  Denies fever    Treatments: Advil cold and sinus  History is negative for asthma and environmental allergies  Tobacco use history is negative    Past Medical History:   Diagnosis Date     Bilateral carpal tunnel syndrome     10/16/2017     Other specified postprocedural states     11/8/2017     Current Outpatient Medications   Medication     acetaminophen (TYLENOL) 500 MG tablet     atorvastatin (LIPITOR) 40 MG tablet     cyclobenzaprine (FLEXERIL) 10 MG tablet     traMADol (ULTRAM) 50 MG tablet     traZODone (DESYREL) 50 MG tablet     venlafaxine (EFFEXOR-XR) 150 MG 24 hr capsule     No current facility-administered medications for this visit.       No Known Allergies      ROS  General: fatigue, no fever  HENT: POSITIVE for sinus symptoms as above  Respiratory: negative  Abdomen: negative    OBJECTIVE:   Vitals:    12/26/18 1230   BP: 112/70   BP Location: Right arm   Patient Position: Sitting   Cuff Size: Adult Large   Pulse: 106   Resp: 28   Temp: 98.4  F (36.9  C)   TempSrc: Tympanic   Weight: 104.1 kg (229 lb 9.6 oz)   Height: 1.6 m (5' 3\")     The patient appears healthy, alert and no distress.   EARS: positive findings: mild effusion on right  NOSE/SINUS: positive findings: mucosa erythematous and swollen  Sinus palpation: Maxillary sinus tender to palpation   THROAT: mild erythema   NECK:Neck supple. No adenopathy. Thyroid symmetric, normal size,   Cardiac: normal RR, no murmur  CHEST:normal respiration, clear to ausculation    ASSESSMENT:   (J01.00) Acute non-recurrent maxillary sinusitis  (primary encounter diagnosis)    Plan: predniSONE (DELTASONE) 20 MG tablet,         amoxicillin-clavulanate (AUGMENTIN) 875-125 MG         tablet    Acute maxillary sinusitis - viral " course  Hydrate well  Start prednisone 40 mg oral tablet, take in AM with food x 5 days  Warm compress, hot steamy shower  OTC decongestant (sudafed), OTC 3 day nasal spray - Afrin,  OTC antihistamine - cetirizine as directed, OTC Nasal sinus rinse. OTC Mucinex (guieffinisen)   Ibuprofen or tylenol as directed for discomfort or fever  Print prescription for an antibiotic to fill if no improvement in 5-7 days. Augmentin oral tablet, take one tablet twice daily x 10 days. Take a probiotic while on this medication  Return to clinic if symptoms persist or worsen  Patient received verbal and written instruction including review of warning signs    Hui Arnold PA-C on 12/26/2018 at 4:45 PM                .

## 2018-12-26 NOTE — PATIENT INSTRUCTIONS
Acute maxillary sinusitis  Hydrated well  Start prednisone 40 mg oral tablet, take in AM with food x 5 days  Warm compress, hot steamy shower  OTC decongestant (sudafed), OTC 3 day nasal spray - Afrin,  OTC antihistamine - cetirizine as directed, OTC Nasal sinus rinse. OTC Mucinex (guieffinisen)   Ibuprofen or tylenol as directed for discomfort or fever  Print prescription for an antibiotic to fill if no improvement in 5-7 days. Augmentin oral tablet, take one tablet twice daily x 10 days. Take a probiotic while on this medication  Return to clinic if symptoms persist or worsen  Seek immediate care for    Facial pain or headache that gets worse    Stiff neck    Unusual drowsiness or confusion    Swelling of your forehead or eyelids    Vision problems, such as blurred or double vision    Fever of 100.4 F (38 C) or higher, or as directed by your healthcare provider    Seizure    Breathing problems    Symptoms don't go away in 10 days    Patient Education     Sinusitis (No Antibiotics)    The sinuses are air-filled spaces within the bones of the face. They connect to the inside of the nose. Sinusitis is an inflammation of the tissue that lines the sinuses. Sinusitis can occur during a cold. It can also happen due to allergies to pollens and other particles in the air. It can cause symptoms such as sinus congestion, headache, sore throat, facial swelling, and a feeling of fullness. It may also cause a low-grade fever. Your sinusitis does not include an infection with bacteria. Because of this, antibiotics are not used to treat this problem.  Home care    Drink plenty of water, hot tea, and other liquids. This may help thin nasal mucus. It also may help your sinuses drain fluids.    Heat may help soothe painful areas of your face. Use a towel soaked in hot water. Or,  the shower and direct the warm spray onto your face. Using a vaporizer along with a menthol rub at night may also help soothe  symptoms.     An expectorant with guaifenesin may help thin nasal mucus and help your sinuses drain fluids.    You can use an over-the-counter decongestant, unless a similar medicine was prescribed to you. Nasal sprays work the fastest. Use one that contains phenylephrine or oxymetazoline. First blow your nose gently. Then use the spray. Do not use these medicines more often than directed on the label. If you do, your symptoms may get worse. You may also take pills that contain pseudoephedrine. Don t use products that combine multiple medicines. This is because side effects may be increased. Read all medicine labels. You can also ask the pharmacist for help. (People with high blood pressure should not use decongestants. They can raise blood pressure.)    Over-the-counter antihistamines may help if allergies contributed to your sinusitis.      Use acetaminophen or ibuprofen to control pain, unless another pain medicine was prescribed to you. If you have chronic liver or kidney disease or ever had a stomach ulcer, talk with your healthcare provider before using these medicines. (Aspirin should never be taken by anyone under age 18 who is ill with a fever. It may cause severe liver damage.)    Use nasal rinses or irrigation as instructed by your healthcare provider.    Don't smoke. This can make symptoms worse.  Follow-up care  Follow up with your healthcare provider or our staff if you are NOT better in 1 week.  When to seek medical advice  Call your healthcare provider if any of these occur:    Green or yellow fluid draining from your nose or into your throat    Facial pain or headache that gets worse    Stiff neck    Unusual drowsiness or confusion    Swelling of your forehead or eyelids    Vision problems, such as blurred or double vision    Fever of 100.4 F (38 C) or higher, or as directed by your healthcare provider    Seizure    Breathing problems    Symptoms that don't go away in 10 days  Date Last Reviewed:  11/1/2017 2000-2018 [employer]. [address] [place] [address], [place] [address]. All rights reserved. This information is not intended as a substitute for professional medical care. Always follow your healthcare professional's instructions.

## 2019-02-26 DIAGNOSIS — M79.7 FIBROMYALGIA: ICD-10-CM

## 2019-02-27 RX ORDER — CYCLOBENZAPRINE HCL 10 MG
TABLET ORAL
Qty: 90 TABLET | Refills: 1 | Status: SHIPPED | OUTPATIENT
Start: 2019-02-27 | End: 2019-04-26

## 2019-02-27 NOTE — TELEPHONE ENCOUNTER
Requested Prescriptions   Pending Prescriptions Disp Refills     cyclobenzaprine (FLEXERIL) 10 MG tablet [Pharmacy Med Name: CYCLOBENZAPRINE HCL 10 MG TABLET] 90 tablet 5     Sig: TAKE 1 TABLET BY MOUTH 3 TIMES DAILY AS NEEDED    There is no refill protocol information for this order        Last office visit with Dr. Humphrey:  12/26/19  Medication not on RN medication refillpolicy.

## 2019-04-26 DIAGNOSIS — M79.7 FIBROMYALGIA: ICD-10-CM

## 2019-04-29 RX ORDER — TRAZODONE HYDROCHLORIDE 50 MG/1
TABLET, FILM COATED ORAL
Qty: 90 TABLET | Refills: 1 | Status: SHIPPED | OUTPATIENT
Start: 2019-04-29 | End: 2019-10-16

## 2019-04-29 RX ORDER — TRAMADOL HYDROCHLORIDE 50 MG/1
TABLET ORAL
Qty: 240 TABLET | Refills: 5 | Status: SHIPPED | OUTPATIENT
Start: 2019-04-29 | End: 2019-10-16

## 2019-04-29 RX ORDER — CYCLOBENZAPRINE HCL 10 MG
TABLET ORAL
Qty: 90 TABLET | Refills: 11 | Status: SHIPPED | OUTPATIENT
Start: 2019-04-29 | End: 2020-02-21

## 2019-04-29 RX ORDER — CYCLOBENZAPRINE HCL 10 MG
TABLET ORAL
Qty: 90 TABLET | Refills: 1 | Status: SHIPPED | OUTPATIENT
Start: 2019-04-29 | End: 2019-04-29

## 2019-04-29 NOTE — TELEPHONE ENCOUNTER
Chart review shows that flexeril was ordered as follows by ROSAMARIA Patel by accident:    Outpatient Medication Detail      Disp Refills Start End DANYELLE   cyclobenzaprine (FLEXERIL) 10 MG tablet 90 tablet 1 4/29/2019  No   Sig: TAKE 1 TABLET BY MOUTH 3 TIMES DAILY AS NEEDED   Sent to pharmacy as: cyclobenzaprine (FLEXERIL) 10 MG tablet   Class: E-Prescribe   Order: [id number]   E-Prescribing Status: Receipt confirmed by pharmacy (4/29/2019  3:28 PM CDT)   Printout Tracking     External Result Report   [facility], [facility], [place] - [address]   Associated Diagnoses     Fibromyalgia [M79.7]       Source Order Set     Order Set Name Order ID    [id number]   Verbal Order Info     Action Created on Order Mode Entered by Responsible Provider Signed by Signed on   Ordering 04/29/19 3564 Fulfillment Order - No MD Sign Goodell, Brenda J., RN        As well as last refill of tramadol was given by PCP as follows:    Outpatient Medication Detail      Disp Refills Start End DANYELLE   traMADol (ULTRAM) 50 MG tablet 240 tablet 5 11/9/2018  No   Sig: TAKE 2 TABLETS BY MOUTH 4 TIMES DAILY   Class: Local Print   Order: [id number]   Outpatient Morphine Equivalent Daily Dose (MEDD)     11/9/18 and after   Unknown   Order Name Dose Route Frequency Maximum MEDD    traMADol (ULTRAM) 50 MG tablet     Unknown   Total Potential Daily Morphine Equivalence Unknown   An error was encountered while attempting to calculate the morphine equivalent daily dose for at least one order.    Calculation Information                Printout Tracking     External Result Report   Medication Administration Instructions     TAKE 2 TABLETS BY MOUTH 4 TIMES DAILY   [facility] [address]     And is coming due for a refill as of 5/9/19. LOV with PCP was on 11/9/18 for med management. No changes noted to either Rx as requested in office visit notes on that date.  Writer will nuha up and route Rx request to PCP for her consideration/approval. Did also notify pharmacy to disregard flexeril Rx as sent over today by accident. Jaz, pharmacy tech will deactivate order.    Unable to complete prescription refill per RN Medication Refill Policy. Tristan Fay 4/29/2019 3:45 PM

## 2019-04-29 NOTE — TELEPHONE ENCOUNTER
"Requested Prescriptions   Pending Prescriptions Disp Refills     traMADol (ULTRAM) 50 MG tablet [Pharmacy Med Name: TRAMADOL HCL 50 MG TABLET] 240 tablet      Sig: TAKE 2 TABLETS BY MOUTH 4 TIMES DAILY.       There is no refill protocol information for this order        cyclobenzaprine (FLEXERIL) 10 MG tablet [Pharmacy Med Name: CYCLOBENZAPRINE HCL 10 MG TABLET] 90 tablet 1     Sig: TAKE 1 TABLET BY MOUTH 3 TIMES DAILY AS NEEDED       There is no refill protocol information for this order        traZODone (DESYREL) 50 MG tablet [Pharmacy Med Name: TRAZODONE HCL 50 MG TABLET] 90 tablet 1     Sig: TAKE 1 TO 2 TABLETS BY MOUTH AT BEDTIME       Serotonin Modulators Passed - 4/26/2019 10:47 AM        Passed - Recent (12 mo) or future (30 days) visit within the authorizing provider's specialty     Patient had office visit in the last 12 months or has a visit in the next 30 days with authorizing provider or within the authorizing provider's specialty.  See \"Patient Info\" tab in inbasket, or \"Choose Columns\" in Meds & Orders section of the refill encounter.              Passed - Medication is active on med list        Passed - Patient is age 18 or older        Passed - No active pregnancy on record        Passed - No positive pregnancy test in past 12 months        LOV 12/26/18    Prescription approved per [facility] Refill Protocol.    "

## 2019-09-06 ENCOUNTER — APPOINTMENT (OUTPATIENT)
Dept: GENERAL RADIOLOGY | Facility: OTHER | Age: 49
End: 2019-09-06
Attending: EMERGENCY MEDICINE
Payer: COMMERCIAL

## 2019-09-06 ENCOUNTER — TRANSFERRED RECORDS (OUTPATIENT)
Dept: HEALTH INFORMATION MANAGEMENT | Facility: OTHER | Age: 49
End: 2019-09-06

## 2019-09-06 ENCOUNTER — HOSPITAL ENCOUNTER (EMERGENCY)
Facility: OTHER | Age: 49
Discharge: ANOTHER HOSPITAL | End: 2019-09-06
Attending: EMERGENCY MEDICINE | Admitting: EMERGENCY MEDICINE
Payer: COMMERCIAL

## 2019-09-06 VITALS
TEMPERATURE: 98.7 F | OXYGEN SATURATION: 94 % | BODY MASS INDEX: 39.27 KG/M2 | DIASTOLIC BLOOD PRESSURE: 86 MMHG | HEART RATE: 100 BPM | WEIGHT: 230 LBS | RESPIRATION RATE: 13 BRPM | HEIGHT: 64 IN | SYSTOLIC BLOOD PRESSURE: 132 MMHG

## 2019-09-06 DIAGNOSIS — I21.4 NON-STEMI (NON-ST ELEVATED MYOCARDIAL INFARCTION) (H): ICD-10-CM

## 2019-09-06 DIAGNOSIS — N28.9 RENAL INSUFFICIENCY: ICD-10-CM

## 2019-09-06 LAB
ALBUMIN SERPL-MCNC: 4.3 G/DL (ref 3.5–5.7)
ALP SERPL-CCNC: 181 U/L (ref 34–104)
ALT SERPL W P-5'-P-CCNC: 11 U/L (ref 7–52)
ANION GAP SERPL CALCULATED.3IONS-SCNC: 10 MMOL/L (ref 3–14)
AST SERPL W P-5'-P-CCNC: 14 U/L (ref 13–39)
BASOPHILS # BLD AUTO: 0.1 10E9/L (ref 0–0.2)
BASOPHILS NFR BLD AUTO: 0.9 %
BILIRUB SERPL-MCNC: 0.4 MG/DL (ref 0.3–1)
BUN SERPL-MCNC: 6 MG/DL (ref 7–25)
CALCIUM SERPL-MCNC: 9.7 MG/DL (ref 8.6–10.3)
CHLORIDE SERPL-SCNC: 101 MMOL/L (ref 98–107)
CO2 SERPL-SCNC: 28 MMOL/L (ref 21–31)
CREAT SERPL-MCNC: 1.28 MG/DL (ref 0.6–1.2)
D DIMER PPP DDU-MCNC: <200 NG/ML D-DU (ref 0–230)
DIFFERENTIAL METHOD BLD: NORMAL
EOSINOPHIL # BLD AUTO: 0.1 10E9/L (ref 0–0.7)
EOSINOPHIL NFR BLD AUTO: 1.4 %
ERYTHROCYTE [DISTWIDTH] IN BLOOD BY AUTOMATED COUNT: 13.3 % (ref 10–15)
GFR SERPL CREATININE-BSD FRML MDRD: 45 ML/MIN/{1.73_M2}
GLUCOSE SERPL-MCNC: 106 MG/DL (ref 70–105)
HCT VFR BLD AUTO: 42.8 % (ref 35–47)
HGB BLD-MCNC: 14.4 G/DL (ref 11.7–15.7)
IMM GRANULOCYTES # BLD: 0 10E9/L (ref 0–0.4)
IMM GRANULOCYTES NFR BLD: 0.3 %
LIPASE SERPL-CCNC: 15 U/L (ref 11–82)
LYMPHOCYTES # BLD AUTO: 1.6 10E9/L (ref 0.8–5.3)
LYMPHOCYTES NFR BLD AUTO: 17 %
MCH RBC QN AUTO: 29.3 PG (ref 26.5–33)
MCHC RBC AUTO-ENTMCNC: 33.6 G/DL (ref 31.5–36.5)
MCV RBC AUTO: 87 FL (ref 78–100)
MONOCYTES # BLD AUTO: 0.4 10E9/L (ref 0–1.3)
MONOCYTES NFR BLD AUTO: 4.5 %
NEUTROPHILS # BLD AUTO: 7.2 10E9/L (ref 1.6–8.3)
NEUTROPHILS NFR BLD AUTO: 75.9 %
PLATELET # BLD AUTO: 329 10E9/L (ref 150–450)
POTASSIUM SERPL-SCNC: 3.5 MMOL/L (ref 3.5–5.1)
PROT SERPL-MCNC: 7.6 G/DL (ref 6.4–8.9)
RBC # BLD AUTO: 4.91 10E12/L (ref 3.8–5.2)
SODIUM SERPL-SCNC: 139 MMOL/L (ref 134–144)
TROPONIN I SERPL-MCNC: 0.13 UG/L (ref 0–0.03)
WBC # BLD AUTO: 9.5 10E9/L (ref 4–11)

## 2019-09-06 PROCEDURE — 84484 ASSAY OF TROPONIN QUANT: CPT | Performed by: EMERGENCY MEDICINE

## 2019-09-06 PROCEDURE — 96365 THER/PROPH/DIAG IV INF INIT: CPT | Performed by: EMERGENCY MEDICINE

## 2019-09-06 PROCEDURE — 96375 TX/PRO/DX INJ NEW DRUG ADDON: CPT | Performed by: EMERGENCY MEDICINE

## 2019-09-06 PROCEDURE — 99285 EMERGENCY DEPT VISIT HI MDM: CPT | Mod: 25 | Performed by: EMERGENCY MEDICINE

## 2019-09-06 PROCEDURE — 71045 X-RAY EXAM CHEST 1 VIEW: CPT

## 2019-09-06 PROCEDURE — 80053 COMPREHEN METABOLIC PANEL: CPT | Performed by: EMERGENCY MEDICINE

## 2019-09-06 PROCEDURE — 85027 COMPLETE CBC AUTOMATED: CPT | Performed by: EMERGENCY MEDICINE

## 2019-09-06 PROCEDURE — 93005 ELECTROCARDIOGRAM TRACING: CPT | Performed by: EMERGENCY MEDICINE

## 2019-09-06 PROCEDURE — 85025 COMPLETE CBC W/AUTO DIFF WBC: CPT | Performed by: EMERGENCY MEDICINE

## 2019-09-06 PROCEDURE — 96366 THER/PROPH/DIAG IV INF ADDON: CPT | Performed by: EMERGENCY MEDICINE

## 2019-09-06 PROCEDURE — 36415 COLL VENOUS BLD VENIPUNCTURE: CPT | Performed by: EMERGENCY MEDICINE

## 2019-09-06 PROCEDURE — 83690 ASSAY OF LIPASE: CPT | Performed by: EMERGENCY MEDICINE

## 2019-09-06 PROCEDURE — 25000125 ZZHC RX 250: Performed by: EMERGENCY MEDICINE

## 2019-09-06 PROCEDURE — 93010 ELECTROCARDIOGRAM REPORT: CPT | Performed by: INTERNAL MEDICINE

## 2019-09-06 PROCEDURE — 85379 FIBRIN DEGRADATION QUANT: CPT | Performed by: EMERGENCY MEDICINE

## 2019-09-06 PROCEDURE — 25800030 ZZH RX IP 258 OP 636: Performed by: EMERGENCY MEDICINE

## 2019-09-06 PROCEDURE — 99285 EMERGENCY DEPT VISIT HI MDM: CPT | Mod: Z6 | Performed by: EMERGENCY MEDICINE

## 2019-09-06 PROCEDURE — 25000132 ZZH RX MED GY IP 250 OP 250 PS 637: Performed by: EMERGENCY MEDICINE

## 2019-09-06 PROCEDURE — 25000128 H RX IP 250 OP 636: Performed by: EMERGENCY MEDICINE

## 2019-09-06 RX ORDER — ONDANSETRON 2 MG/ML
4 INJECTION INTRAMUSCULAR; INTRAVENOUS
Status: COMPLETED | OUTPATIENT
Start: 2019-09-06 | End: 2019-09-06

## 2019-09-06 RX ORDER — METOPROLOL TARTRATE 1 MG/ML
5 INJECTION, SOLUTION INTRAVENOUS ONCE
Status: COMPLETED | OUTPATIENT
Start: 2019-09-06 | End: 2019-09-06

## 2019-09-06 RX ORDER — SODIUM CHLORIDE 9 MG/ML
INJECTION, SOLUTION INTRAVENOUS CONTINUOUS
Status: DISCONTINUED | OUTPATIENT
Start: 2019-09-06 | End: 2019-09-06 | Stop reason: HOSPADM

## 2019-09-06 RX ORDER — HEPARIN SODIUM 5000 [USP'U]/.5ML
6000 INJECTION, SOLUTION INTRAVENOUS; SUBCUTANEOUS ONCE
Status: COMPLETED | OUTPATIENT
Start: 2019-09-06 | End: 2019-09-06

## 2019-09-06 RX ORDER — MORPHINE SULFATE 2 MG/ML
2 INJECTION, SOLUTION INTRAMUSCULAR; INTRAVENOUS
Status: DISCONTINUED | OUTPATIENT
Start: 2019-09-06 | End: 2019-09-06 | Stop reason: HOSPADM

## 2019-09-06 RX ORDER — ASPIRIN 81 MG/1
324 TABLET, CHEWABLE ORAL ONCE
Status: COMPLETED | OUTPATIENT
Start: 2019-09-06 | End: 2019-09-06

## 2019-09-06 RX ORDER — LIDOCAINE HYDROCHLORIDE 20 MG/ML
15 SOLUTION OROPHARYNGEAL ONCE
Status: COMPLETED | OUTPATIENT
Start: 2019-09-06 | End: 2019-09-06

## 2019-09-06 RX ORDER — MORPHINE SULFATE 2 MG/ML
2 INJECTION, SOLUTION INTRAMUSCULAR; INTRAVENOUS
Status: COMPLETED | OUTPATIENT
Start: 2019-09-06 | End: 2019-09-06

## 2019-09-06 RX ORDER — HEPARIN SODIUM 5000 [USP'U]/.5ML
INJECTION, SOLUTION INTRAVENOUS; SUBCUTANEOUS EVERY 6 HOURS PRN
Status: DISCONTINUED | OUTPATIENT
Start: 2019-09-06 | End: 2019-09-06 | Stop reason: HOSPADM

## 2019-09-06 RX ORDER — NITROGLYCERIN 0.4 MG/1
0.4 TABLET SUBLINGUAL EVERY 5 MIN PRN
Status: DISCONTINUED | OUTPATIENT
Start: 2019-09-06 | End: 2019-09-06 | Stop reason: HOSPADM

## 2019-09-06 RX ORDER — ALUMINA, MAGNESIA, AND SIMETHICONE 2400; 2400; 240 MG/30ML; MG/30ML; MG/30ML
15 SUSPENSION ORAL ONCE
Status: COMPLETED | OUTPATIENT
Start: 2019-09-06 | End: 2019-09-06

## 2019-09-06 RX ORDER — HEPARIN SODIUM 10000 [USP'U]/100ML
0-3500 INJECTION, SOLUTION INTRAVENOUS CONTINUOUS
Status: DISCONTINUED | OUTPATIENT
Start: 2019-09-06 | End: 2019-09-06 | Stop reason: HOSPADM

## 2019-09-06 RX ADMIN — METOPROLOL TARTRATE 5 MG: 1 INJECTION, SOLUTION INTRAVENOUS at 10:07

## 2019-09-06 RX ADMIN — ONDANSETRON HYDROCHLORIDE 4 MG: 2 SOLUTION INTRAMUSCULAR; INTRAVENOUS at 09:05

## 2019-09-06 RX ADMIN — ASPIRIN 81 MG 324 MG: 81 TABLET ORAL at 09:05

## 2019-09-06 RX ADMIN — HEPARIN SODIUM 1200 UNITS/HR: 10000 INJECTION, SOLUTION INTRAVENOUS at 09:51

## 2019-09-06 RX ADMIN — NITROGLYCERIN 0.4 MG: 0.4 TABLET SUBLINGUAL at 10:00

## 2019-09-06 RX ADMIN — HEPARIN SODIUM 6000 UNITS: 10000 INJECTION, SOLUTION INTRAVENOUS; SUBCUTANEOUS at 09:49

## 2019-09-06 RX ADMIN — MORPHINE SULFATE 2 MG: 2 INJECTION, SOLUTION INTRAMUSCULAR; INTRAVENOUS at 09:05

## 2019-09-06 RX ADMIN — LIDOCAINE HYDROCHLORIDE 15 ML: 20 SOLUTION ORAL; TOPICAL at 09:10

## 2019-09-06 RX ADMIN — SODIUM CHLORIDE: 9 INJECTION, SOLUTION INTRAVENOUS at 09:07

## 2019-09-06 RX ADMIN — ALUMINUM HYDROXIDE, MAGNESIUM HYDROXIDE, AND DIMETHICONE 15 ML: 400; 400; 40 SUSPENSION ORAL at 09:10

## 2019-09-06 ASSESSMENT — ENCOUNTER SYMPTOMS
AGITATION: 0
VOMITING: 0
DYSURIA: 0
ARTHRALGIAS: 0
CHEST TIGHTNESS: 0
NAUSEA: 0
CHILLS: 1
DIAPHORESIS: 1
SHORTNESS OF BREATH: 0
FEVER: 0
LIGHT-HEADEDNESS: 0

## 2019-09-06 ASSESSMENT — MIFFLIN-ST. JEOR: SCORE: 1663.27

## 2019-09-06 NOTE — ED NOTES
Patient having no pain prior to transport.  Report given to team.  Patient very appreciative of care given.

## 2019-09-06 NOTE — ED PROVIDER NOTES
History     Chief Complaint   Patient presents with     Nausea, Vomiting, & Diarrhea     Chest Pain     HPI  Fabiana Sanders is a 48 year old female who comes in complaining of chest pain and nausea and vomiting.  She states she has been having these problems pretty much constantly for the last 1 to 2 weeks.  She describes a chest pain that is right between her breasts.  It is there constantly.  At rest it is a 6 out of 10.  It is worse when she takes deep breaths.  It is worse when she tries to eat.  She says she feels like there is something stuck in her esophagus right at this level.  She can drink liquids without any difficulty, but having a hard time eating anything.  She has frequent nausea and vomiting, not only when she is trying to eat or drink.  She was up every couple of hours last night throwing up.  The last time it was for only with about a quarter sized amount of bright red blood.  Also frequent diarrhea but it is watery with no blood in it.  Not black or tarry.  She feels clammy and chilled but has not had any fevers.  No respiratory difficulties.  She is taking Zantac 150 mg, up to 5 times daily.    Allergies:  No Known Allergies    Problem List:    Patient Active Problem List    Diagnosis Date Noted     Adjustment disorder with mixed anxiety and depressed mood 04/11/2018     Priority: Medium     Chronic midline low back pain without sciatica 11/27/2017     Priority: Medium     Fibromyalgia 11/27/2017     Priority: Medium     S/P carpal tunnel release 11/08/2017     Priority: Medium     Other hyperlipidemia 10/23/2017     Priority: Medium     Bilateral carpal tunnel syndrome 10/16/2017     Priority: Medium     Depression 10/06/2017     Priority: Medium     Arthritis 09/28/2017     Priority: Medium        Past Medical History:    Past Medical History:   Diagnosis Date     Bilateral carpal tunnel syndrome      Other specified postprocedural states        Past Surgical History:    Past Surgical  "History:   Procedure Laterality Date     LAPAROSCOPIC TUBAL LIGATION           OTHER SURGICAL HISTORY      2007,[id number],INGUINAL HERNIA REPAIR     OTHER SURGICAL HISTORY      2009,[id number],PARTIAL HYSTERECTOMY,has both ovaries     OTHER SURGICAL HISTORY      2017,[id number],OTHER,Right       Family History:    Family History   Problem Relation Age of Onset     Other - See Comments Father         Throat cancer/brain tumor     Cancer Maternal Grandmother         Cancer,\"female organs\"     Other - See Comments Paternal Grandmother         Stroke     Other - See Comments Paternal Grandfather         Throat cancer       Social History:  Marital Status:   [2]  Social History     Tobacco Use     Smoking status: Former Smoker     Packs/day: 1.00     Last attempt to quit: 2016     Years since quittin.9     Smokeless tobacco: Never Used   Substance Use Topics     Alcohol use: No     Drug use: No        Medications:      acetaminophen (TYLENOL) 500 MG tablet   atorvastatin (LIPITOR) 40 MG tablet   cyclobenzaprine (FLEXERIL) 10 MG tablet   ranitidine (ZANTAC) 150 MG tablet   traMADol (ULTRAM) 50 MG tablet   traZODone (DESYREL) 50 MG tablet   venlafaxine (EFFEXOR-XR) 150 MG 24 hr capsule         Review of Systems   Constitutional: Positive for chills and diaphoresis. Negative for fever.   HENT: Negative for congestion.    Eyes: Negative for visual disturbance.   Respiratory: Negative for chest tightness and shortness of breath.    Cardiovascular: Positive for chest pain.   Gastrointestinal: Negative for nausea and vomiting.   Genitourinary: Negative for dysuria.   Musculoskeletal: Negative for arthralgias.   Skin: Negative for rash.   Neurological: Negative for light-headedness.   Psychiatric/Behavioral: Negative for agitation.       Physical Exam   BP: (!) 171/103  Pulse: 116  Heart Rate: 116  Temp: 98.7  F (37.1  C)  Resp: 20  Height: 162.6 cm (5' 4\")  Weight: 104.3 kg (230 lb)  SpO2: 93 %      Physical " Exam   Constitutional: She is oriented to person, place, and time. She appears well-developed and well-nourished.   HENT:   Head: Normocephalic and atraumatic.   Eyes: Conjunctivae are normal.   Neck: Normal range of motion. Neck supple.   Cardiovascular: Normal rate, regular rhythm and normal heart sounds.   Pulmonary/Chest: Effort normal and breath sounds normal. She exhibits tenderness.   Abdominal: Soft. Bowel sounds are normal.   Mild tenderness epigastric and right upper quadrant.  Negative Lucero's.   Neurological: She is alert and oriented to person, place, and time.   Skin: Skin is warm and dry.   Psychiatric: She has a normal mood and affect. Her behavior is normal.   Nursing note and vitals reviewed.      ED Course        Procedures               EKG Interpretation:      Interpreted by Dany Hamilton MD  Time reviewed: 0830  Symptoms at time of EKG: chest pain   Rhythm: sinus tachycardia  Rate: 110-120  Axis: Right Axis Deviation  Ectopy: none  Conduction: normal  ST Segments/ T Waves: No ST-T wave changes  Q Waves: none  Comparison to prior: No old EKG available    Clinical Impression: atrial fibrillation      Critical Care time:  was 60 minutes for this patient excluding procedures.           Results for orders placed or performed during the hospital encounter of 09/06/19 (from the past 24 hour(s))   CBC with platelets differential   Result Value Ref Range    WBC 9.5 4.0 - 11.0 10e9/L    RBC Count 4.91 3.8 - 5.2 10e12/L    Hemoglobin 14.4 11.7 - 15.7 g/dL    Hematocrit 42.8 35.0 - 47.0 %    MCV 87 78 - 100 fl    MCH 29.3 26.5 - 33.0 pg    MCHC 33.6 31.5 - 36.5 g/dL    RDW 13.3 10.0 - 15.0 %    Platelet Count 329 150 - 450 10e9/L    Diff Method Automated Method     % Neutrophils 75.9 %    % Lymphocytes 17.0 %    % Monocytes 4.5 %    % Eosinophils 1.4 %    % Basophils 0.9 %    % Immature Granulocytes 0.3 %    Absolute Neutrophil 7.2 1.6 - 8.3 10e9/L    Absolute Lymphocytes 1.6 0.8 - 5.3 10e9/L    Absolute  Monocytes 0.4 0.0 - 1.3 10e9/L    Absolute Eosinophils 0.1 0.0 - 0.7 10e9/L    Absolute Basophils 0.1 0.0 - 0.2 10e9/L    Abs Immature Granulocytes 0.0 0 - 0.4 10e9/L   Troponin I   Result Value Ref Range    Troponin I ES 0.127 (H) 0.000 - 0.034 ug/L   Comprehensive metabolic panel   Result Value Ref Range    Sodium 139 134 - 144 mmol/L    Potassium 3.5 3.5 - 5.1 mmol/L    Chloride 101 98 - 107 mmol/L    Carbon Dioxide 28 21 - 31 mmol/L    Anion Gap 10 3 - 14 mmol/L    Glucose 106 (H) 70 - 105 mg/dL    Urea Nitrogen 6 (L) 7 - 25 mg/dL    Creatinine 1.28 (H) 0.60 - 1.20 mg/dL    GFR Estimate 45 (L) >60 mL/min/[1.73_m2]    GFR Estimate If Black 54 (L) >60 mL/min/[1.73_m2]    Calcium 9.7 8.6 - 10.3 mg/dL    Bilirubin Total 0.4 0.3 - 1.0 mg/dL    Albumin 4.3 3.5 - 5.7 g/dL    Protein Total 7.6 6.4 - 8.9 g/dL    Alkaline Phosphatase 181 (H) 34 - 104 U/L    ALT 11 7 - 52 U/L    AST 14 13 - 39 U/L   Lipase   Result Value Ref Range    Lipase 15 11 - 82 U/L   D-Dimer (HI,GH)   Result Value Ref Range    D-Dimer ng/mL <200 0 - 230 ng/ml D-DU   XR Chest Port 1 View    Narrative    PROCEDURE:  XR CHEST PORT 1 VW    HISTORY:  chest pain.     COMPARISON:  None.    FINDINGS:   The cardiac silhouette is normal in size. The pulmonary vasculature is  normal.  The lungs are clear. No pleural effusion or pneumothorax.      Impression    IMPRESSION:  No acute cardiopulmonary disease.      VASHTI VELASQUEZ MD       Medications   sodium chloride 0.9% infusion ( Intravenous New Bag 9/6/19 0907)   heparin infusion 25,000 units in D5W 250 mL (1,200 Units/hr Intravenous Handoff 9/6/19 0955)   heparin bolus injection (dose rounds in 50 unit increments) (has no administration in time range)   metoprolol (LOPRESSOR) injection 5 mg (has no administration in time range)   nitroGLYcerin (NITROSTAT) sublingual tablet 0.4 mg (has no administration in time range)   morphine (PF) injection 2 mg (has no administration in time range)   aspirin (ASA)  chewable tablet 324 mg (324 mg Oral Given 9/6/19 0905)   alum & mag hydroxide-simethicone (MYLANTA ES/MAALOX  ES) suspension 15 mL (15 mLs Oral Given 9/6/19 0910)     And   lidocaine (XYLOCAINE) 2 % solution 15 mL (15 mLs Mouth/Throat Given 9/6/19 0910)   morphine (PF) injection 2 mg (2 mg Intravenous Given 9/6/19 0905)   ondansetron (ZOFRAN) injection 4 mg (4 mg Intravenous Given 9/6/19 0905)   heparin loading dose for LOW INTENSITY TREATMENT* Give BEFORE starting heparin infusion (6,000 Units Intravenous Handoff 9/6/19 0952)       Assessments & Plan (with Medical Decision Making)     I have reviewed the nursing notes.    I have reviewed the findings, diagnosis, plan and need for follow up with the patient.  Patient is feeling much better after above interventions.  Her troponin has come back elevated at 1.28.  EKG does not show any acute ST segment or T wave changes.  Appears to be having a non-STEMI.  Other labs mostly reassuring.  She does appear to have some acute renal insufficiency as well.  She is receiving IV fluids.  I have ordered some IV heparin.  She is mildly tachycardic in the 1 teen range, so will also be given a single dose of IV Lopressor.  I spoke with Dr. Ruiz, hospitalist at [facility] who has accepted the patient in transfer.     New Prescriptions    No medications on file       Final diagnoses:   Non-STEMI (non-ST elevated myocardial infarction) (H)   Renal insufficiency       9/6/2019   [facility]     Dany Hamilton MD  09/06/19 0911       Dany Hamilton MD  09/06/19 4995

## 2019-09-06 NOTE — ED TRIAGE NOTES
Patient presents with constant nausea and heartburn resulting in emesis.  She states she has had night sweats, multiple emesis (this morning had brownish, pinkish hue to it).  She currently has pain that is radiating through to her back 7/10, described as sharp with deep breath.

## 2019-09-06 NOTE — ED NOTES
"Patient laying on cart comfortably and stating her heartburn is gone completely.  Her chest still \"aches\" with breathing rating 1/10. Will report to MD. Cook back elevated.   "

## 2019-09-08 ENCOUNTER — TRANSFERRED RECORDS (OUTPATIENT)
Dept: HEALTH INFORMATION MANAGEMENT | Facility: OTHER | Age: 49
End: 2019-09-08

## 2019-09-10 ENCOUNTER — MYC MEDICAL ADVICE (OUTPATIENT)
Dept: FAMILY MEDICINE | Facility: OTHER | Age: 49
End: 2019-09-10

## 2019-09-10 DIAGNOSIS — R07.9 CHEST PAIN, UNSPECIFIED TYPE: Primary | ICD-10-CM

## 2019-09-23 ENCOUNTER — OFFICE VISIT (OUTPATIENT)
Dept: INTERNAL MEDICINE | Facility: OTHER | Age: 49
End: 2019-09-23
Attending: FAMILY MEDICINE
Payer: COMMERCIAL

## 2019-09-23 VITALS
TEMPERATURE: 97.5 F | HEART RATE: 88 BPM | SYSTOLIC BLOOD PRESSURE: 132 MMHG | WEIGHT: 235.8 LBS | RESPIRATION RATE: 20 BRPM | BODY MASS INDEX: 40.47 KG/M2 | DIASTOLIC BLOOD PRESSURE: 88 MMHG

## 2019-09-23 DIAGNOSIS — E78.49 OTHER HYPERLIPIDEMIA: ICD-10-CM

## 2019-09-23 DIAGNOSIS — K22.2 SCHATZKI'S RING: ICD-10-CM

## 2019-09-23 DIAGNOSIS — R74.8 ELEVATED ALKALINE PHOSPHATASE LEVEL: ICD-10-CM

## 2019-09-23 DIAGNOSIS — R07.9 CHEST PAIN, UNSPECIFIED TYPE: Primary | ICD-10-CM

## 2019-09-23 LAB
ALP SERPL-CCNC: 133 U/L (ref 34–104)
GGT SERPL-CCNC: 20 U/L (ref 9–64)

## 2019-09-23 PROCEDURE — G0463 HOSPITAL OUTPT CLINIC VISIT: HCPCS

## 2019-09-23 PROCEDURE — 83915 ASSAY OF NUCLEOTIDASE: CPT | Mod: ZL | Performed by: INTERNAL MEDICINE

## 2019-09-23 PROCEDURE — 36415 COLL VENOUS BLD VENIPUNCTURE: CPT | Mod: ZL | Performed by: INTERNAL MEDICINE

## 2019-09-23 PROCEDURE — 99215 OFFICE O/P EST HI 40 MIN: CPT | Performed by: INTERNAL MEDICINE

## 2019-09-23 PROCEDURE — 84075 ASSAY ALKALINE PHOSPHATASE: CPT | Mod: ZL | Performed by: INTERNAL MEDICINE

## 2019-09-23 PROCEDURE — 82977 ASSAY OF GGT: CPT | Mod: ZL | Performed by: INTERNAL MEDICINE

## 2019-09-23 RX ORDER — PANTOPRAZOLE SODIUM 40 MG/1
40 TABLET, DELAYED RELEASE ORAL DAILY
Refills: 0 | COMMUNITY
Start: 2019-09-08 | End: 2020-01-21

## 2019-09-23 RX ORDER — SACCHAROMYCES BOULARDII 250 MG
250 CAPSULE ORAL DAILY
COMMUNITY
Start: 2019-09-23 | End: 2019-09-25

## 2019-09-23 RX ORDER — SUCRALFATE 1 G/1
TABLET ORAL
Refills: 0 | COMMUNITY
Start: 2019-09-08 | End: 2019-10-31

## 2019-09-23 ASSESSMENT — PAIN SCALES - GENERAL: PAINLEVEL: NO PAIN (0)

## 2019-09-23 NOTE — NURSING NOTE
"The patient is here today to have a consult for a possible stress test.  Amber Baker LPN on 9/23/2019 at 3:14 PM  Chief Complaint   Patient presents with     Consult       Initial /88 (BP Location: Right arm, Patient Position: Sitting, Cuff Size: Adult Large)   Pulse 88   Temp 97.5  F (36.4  C) (Tympanic)   Resp 20   Wt 107 kg (235 lb 12.8 oz)   Breastfeeding? No   BMI 40.47 kg/m   Estimated body mass index is 40.47 kg/m  as calculated from the following:    Height as of 9/6/19: 1.626 m (5' 4\").    Weight as of this encounter: 107 kg (235 lb 12.8 oz).  Medication Reconciliation: complete    Amber Baker LPN    "

## 2019-09-23 NOTE — PROGRESS NOTES
Chief Complaint:  This patient is here for a consultation regarding chest pain.    HPI: Consultation is requested by Dr. Humphrey.  This patient had a recent ER visit and transferred to [place].  She had a lot of heartburn throughout the day and into the night.  She woke up in the early morning hours with severe chest pain.  She presented to the emergency room here for further evaluation.  EKG showed tachycardia but no acute changes.  Chest x-ray was negative.  Troponin came back slightly elevated so she was deemed non-STEMI and was transferred to [place].  In [place], her troponin levels remained normal.  EKG remained normal.  It was felt that her chest pain was more likely related to GI issues.    She was seen in consultation by gastroenterology.  She had an upper GI endoscopy which revealed a Schatzki's ring and she had dilation.  Biopsies of the stomach revealed gastritis but was negative for H. pylori.  She was discharged home on Carafate and a probiotic and is feeling much better.  She apparently is supposed to have another upper GI endoscopy in 6 months and she also needs to have a colonoscopy as she has a history of colonic polyps, she is way overdue for the latter.    In perusing her laboratory results, it is noticed that her alkaline phosphatase has been high and was recently higher than its been in the past.  She does not have any gallbladder issues per se.  This has not been evaluated further.    Medications are reconciled.  Past medical history, past surgical history, family history and social histories are reviewed and updated.  She does have treated hyperlipidemia.  She has a previous history of cigarette smoking.  She is obese.  She has borderline diabetes.  She does not have hypertension.  There is some heart disease in the family history.    Past Medical History:   Diagnosis Date     Bilateral carpal tunnel syndrome     10/16/2017     Depression      Family history of colon cancer      Fibromyalgia       "GERD (gastroesophageal reflux disease)      Hyperlipidemia      Schatzki's ring        Past Surgical History:   Procedure Laterality Date     CARPAL TUNNEL RELEASE RT/LT Right 2017     COLONOSCOPY  2007    Multiple polyps     ESOPHAGOGASTRODUODENOSCOPY  2019    Dilation Schatzki's ring     HERNIA REPAIR, INGUINAL RT/LT Left 2007     HYSTERECTOMY  2009    has both ovaries     LAPAROSCOPIC TUBAL LIGATION      2001       Current Outpatient Medications   Medication Sig Dispense Refill     acetaminophen (TYLENOL) 500 MG tablet Take 1,000 mg by mouth every 6 hours as needed Max acetaminophen dose: 4000 mg in 24 hours       atorvastatin (LIPITOR) 40 MG tablet Take 1 tablet (40 mg) by mouth daily 90 tablet 3     cyclobenzaprine (FLEXERIL) 10 MG tablet TAKE 1 TABLET BY MOUTH 3 TIMES DAILY AS NEEDED 90 tablet 11     pantoprazole (PROTONIX) 40 MG EC tablet Take 40 mg by mouth daily  0     saccharomyces boulardii (FLORASTOR) 250 MG capsule Take 1 capsule (250 mg) by mouth daily       sucralfate (CARAFATE) 1 GM tablet TAKE 1 TABLET BY MOUTH 4 TIMES DAILY  0     traMADol (ULTRAM) 50 MG tablet TAKE 2 TABLETS BY MOUTH 4 TIMES DAILY. 240 tablet 5     traZODone (DESYREL) 50 MG tablet TAKE 1 TO 2 TABLETS BY MOUTH AT BEDTIME 90 tablet 1     venlafaxine (EFFEXOR-XR) 150 MG 24 hr capsule Take 1 capsule (150 mg) by mouth daily 90 capsule 3       No Known Allergies    Family History   Problem Relation Age of Onset     Heart Disease Mother      Other - See Comments Father         Throat cancer/brain tumor     Colon Cancer Father      Cancer Maternal Grandmother         Cancer,\"female organs\"     Other - See Comments Paternal Grandmother         Stroke     Other - See Comments Paternal Grandfather         Throat cancer       Social History     Socioeconomic History     Marital status:      Spouse name: Not on file     Number of children: Not on file     Years of education: Not on file     Highest education level: Not on file "   Occupational History     Not on file   Social Needs     Financial resource strain: Not on file     Food insecurity:     Worry: Not on file     Inability: Not on file     Transportation needs:     Medical: Not on file     Non-medical: Not on file   Tobacco Use     Smoking status: Former Smoker     Packs/day: 1.00     Last attempt to quit: 2016     Years since quittin.9     Smokeless tobacco: Never Used   Substance and Sexual Activity     Alcohol use: No     Drug use: No     Sexual activity: Yes     Partners: Male   Lifestyle     Physical activity:     Days per week: Not on file     Minutes per session: Not on file     Stress: Not on file   Relationships     Social connections:     Talks on phone: Not on file     Gets together: Not on file     Attends [employer] service: Not on file     Active member of club or organization: Not on file     Attends meetings of clubs or organizations: Not on file     Relationship status: Not on file     Intimate partner violence:     Fear of current or ex partner: Not on file     Emotionally abused: Not on file     Physically abused: Not on file     Forced sexual activity: Not on file   Other Topics Concern     Parent/sibling w/ CABG, MI or angioplasty before 65F 55M? Not Asked   Social History Narrative    Lives in [place] with .  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   Constitutional:       General: She is not in acute distress.     Appearance: Normal appearance. She is obese. She is not ill-appearing, toxic-appearing or diaphoretic.   HENT:      Head: Normocephalic.   Neck:      Musculoskeletal: Normal range of motion and neck supple. No neck rigidity or muscular tenderness.   Cardiovascular:      Rate and Rhythm: Normal rate and regular rhythm.      Heart sounds: Normal heart sounds. No murmur. No gallop.    Pulmonary:      Effort:  Pulmonary effort is normal.      Breath sounds: Normal breath sounds. No wheezing, rhonchi or rales.   Chest:      Chest wall: No tenderness.   Abdominal:      General: Abdomen is flat. Bowel sounds are normal. There is no distension.      Palpations: Abdomen is soft. There is no mass.      Tenderness: There is no tenderness. There is no guarding.   Musculoskeletal:      Right lower leg: No edema.      Left lower leg: No edema.   Lymphadenopathy:      Cervical: No cervical adenopathy.   Skin:     General: Skin is warm and dry.   Neurological:      Mental Status: She is alert and oriented to person, place, and time.   Psychiatric:         Mood and Affect: Mood normal.         Assessment:      ICD-10-CM    1. Chest pain, unspecified type R07.9 Echo Stress Echocardiogram   2. Elevated alkaline phosphatase level R74.8 Alkaline Phosphatase     GAMMA GT (GGT)     5' Nucleotidase     5' Nucleotidase     GAMMA GT (GGT)     Alkaline Phosphatase   3. Other hyperlipidemia E78.49    4. Schatzki's ring K22.2         Plan: More likely than not, this patient's episode of chest pain was secondary to her GI issues including her gastritis and Schatzki's ring.  However, she did have an elevated troponin level and does have numerous cardiac risk factors.  We will proceed with stress testing for further evaluation.  Of note is that an echocardiogram done in [place] was normal.  She will be scheduled for a stress echo in the near future, she feels that she should be able to complete that test.    In regards to her elevated alkaline phosphatase, I am going to investigate this somewhat further by repeating her alkaline phosphatase level as well as a GGT and 5' nucleotidase levels for further clarification.  We will proceed with further investigation into that if warranted based on these results.

## 2019-09-23 NOTE — LETTER
September 26, 2019      Fabiana Sanders  [address] [facility] [address]        Dear Fabiana,    Below are the results of your recent labs:    Results for orders placed or performed in visit on 09/23/19   5' Nucleotidase   Result Value Ref Range    5' Nucleotidase 11 0 - 15 U/L   GAMMA GT (GGT)   Result Value Ref Range    GGT 20 9 - 64 U/L   Alkaline Phosphatase   Result Value Ref Range    Alkaline Phosphatase 133 (H) 34 - 104 U/L        Your alkaline phosphatase level has improved.  The other 2 liver tests are normal.  For the present time, I would not evaluate this any further.  I would suggest that you have the alkaline phosphatase checked again in a couple of months.  If you have questions about your results, feel free to contact me.    Sincerely,        Hollis Shah MD  Internal Medicine  [facility]

## 2019-09-25 ENCOUNTER — MYC REFILL (OUTPATIENT)
Dept: FAMILY MEDICINE | Facility: OTHER | Age: 49
End: 2019-09-25

## 2019-09-25 DIAGNOSIS — K22.2 SCHATZKI'S RING: Primary | ICD-10-CM

## 2019-09-25 RX ORDER — SACCHAROMYCES BOULARDII 250 MG
250 CAPSULE ORAL DAILY
Qty: 90 CAPSULE | Refills: 3 | Status: SHIPPED | OUTPATIENT
Start: 2019-09-25 | End: 2019-10-31

## 2019-09-26 LAB — 5NT SERPL-CCNC: 11 U/L (ref 0–15)

## 2019-10-02 ENCOUNTER — TELEPHONE (OUTPATIENT)
Dept: CARDIOLOGY | Facility: OTHER | Age: 49
End: 2019-10-02

## 2019-10-04 ENCOUNTER — HOSPITAL ENCOUNTER (OUTPATIENT)
Dept: CARDIOLOGY | Facility: OTHER | Age: 49
Discharge: HOME | End: 2019-10-04
Attending: INTERNAL MEDICINE | Admitting: INTERNAL MEDICINE
Payer: COMMERCIAL

## 2019-10-04 DIAGNOSIS — R07.9 CHEST PAIN, UNSPECIFIED TYPE: ICD-10-CM

## 2019-10-04 PROCEDURE — 93016 CV STRESS TEST SUPVJ ONLY: CPT | Performed by: INTERNAL MEDICINE

## 2019-10-04 PROCEDURE — 93350 STRESS TTE ONLY: CPT | Mod: 26 | Performed by: INTERNAL MEDICINE

## 2019-10-04 PROCEDURE — 93018 CV STRESS TEST I&R ONLY: CPT | Performed by: INTERNAL MEDICINE

## 2019-10-04 PROCEDURE — 40000264 ECHO STRESS ECHOCARDIOGRAM

## 2019-10-04 PROCEDURE — 93321 DOPPLER ECHO F-UP/LMTD STD: CPT | Mod: 26 | Performed by: INTERNAL MEDICINE

## 2019-10-04 PROCEDURE — 25500064 ZZH RX 255 OP 636: Performed by: INTERNAL MEDICINE

## 2019-10-04 PROCEDURE — 93325 DOPPLER ECHO COLOR FLOW MAPG: CPT | Mod: 26 | Performed by: INTERNAL MEDICINE

## 2019-10-04 RX ADMIN — PERFLUTREN 5 ML: 6.52 INJECTION, SUSPENSION INTRAVENOUS at 15:00

## 2019-10-04 NOTE — PROGRESS NOTES
14:00: The patient arrived for a stress echo.  The procedure, risks and benefits were discussed and the consent was signed.  The patient was prepped for the stress test, and an IV was placed per procedure.  The echo sonographer did the initial images with definity for image enhancement.    arrived, and the patient biked 7minutes and 26seconds.  The patient tolerated the procedure.  Stress images were completed and the IV was removed per procedure.  The patient was released in stable condition.  The patient was instructed that the ordering MD will call with results in one to two days. She has a follow appointment with Dr. Humphrey on 10/16/19.  Please see the chart for complete test results.

## 2019-10-16 ENCOUNTER — OFFICE VISIT (OUTPATIENT)
Dept: FAMILY MEDICINE | Facility: OTHER | Age: 49
End: 2019-10-16
Attending: FAMILY MEDICINE
Payer: COMMERCIAL

## 2019-10-16 VITALS
HEART RATE: 88 BPM | RESPIRATION RATE: 20 BRPM | DIASTOLIC BLOOD PRESSURE: 74 MMHG | WEIGHT: 233.8 LBS | BODY MASS INDEX: 40.13 KG/M2 | SYSTOLIC BLOOD PRESSURE: 132 MMHG | TEMPERATURE: 97.3 F

## 2019-10-16 DIAGNOSIS — L01.00 IMPETIGO: Primary | ICD-10-CM

## 2019-10-16 DIAGNOSIS — Z12.11 SPECIAL SCREENING FOR MALIGNANT NEOPLASMS, COLON: ICD-10-CM

## 2019-10-16 DIAGNOSIS — M79.7 FIBROMYALGIA: ICD-10-CM

## 2019-10-16 PROCEDURE — 99214 OFFICE O/P EST MOD 30 MIN: CPT | Performed by: FAMILY MEDICINE

## 2019-10-16 PROCEDURE — G0463 HOSPITAL OUTPT CLINIC VISIT: HCPCS

## 2019-10-16 RX ORDER — TRAMADOL HYDROCHLORIDE 50 MG/1
TABLET ORAL
Qty: 240 TABLET | Refills: 5 | Status: SHIPPED | OUTPATIENT
Start: 2019-10-16 | End: 2020-02-21

## 2019-10-16 RX ORDER — CEPHALEXIN 500 MG/1
500 CAPSULE ORAL 4 TIMES DAILY
Qty: 28 CAPSULE | Refills: 0 | Status: SHIPPED | OUTPATIENT
Start: 2019-10-16 | End: 2019-10-31

## 2019-10-16 RX ORDER — TRAZODONE HYDROCHLORIDE 50 MG/1
TABLET, FILM COATED ORAL
Qty: 90 TABLET | Refills: 1 | Status: SHIPPED | OUTPATIENT
Start: 2019-10-16 | End: 2020-01-31

## 2019-10-16 ASSESSMENT — PAIN SCALES - GENERAL: PAINLEVEL: NO PAIN (0)

## 2019-10-16 NOTE — LETTER
My Depression Action Plan  Name: Fabiana Sanders   Date of Birth 1970  Date: 10/16/2019    My doctor: Radha Humphrey   My clinic: [facility]  [address]  [place] [id number] [phone]          GREEN    ZONE   Good Control    What it looks like:     Things are going generally well. You have normal up s and down s. You may even feel depressed from time to time, but bad moods usually last less than a day.   What you need to do:  1. Continue to care for yourself (see self care plan)  2. Check your depression survival kit and update it as needed  3. Follow your physician s recommendations including any medication.  4. Do not stop taking medication unless you consult with your physician first.           YELLOW         ZONE Getting Worse    What it looks like:     Depression is starting to interfere with your life.     It may be hard to get out of bed; you may be starting to isolate yourself from others.    Symptoms of depression are starting to last most all day and this has happened for several days.     You may have suicidal thoughts but they are not constant.   What you need to do:     1. Call your care team, your response to treatment will improve if you keep your care team informed of your progress. Yellow periods are signs an adjustment may need to be made.     2. Continue your self-care, even if you have to fake it!    3. Talk to someone in your support network    4. Open up your depression survival kit           RED    ZONE Medical Alert - Get Help    What it looks like:     Depression is seriously interfering with your life.     You may experience these or other symptoms: You can t get out of bed most days, can t work or engage in other necessary activities, you have trouble taking care of basic hygiene, or basic responsibilities, thoughts of suicide or death that will not go away, self-injurious behavior.     What you need to do:  1. Call your care team  and request a same-day appointment. If they are not available (weekends or after hours) call your local crisis line, emergency room or 911.            Depression Self Care Plan / Survival Kit    Self-Care for Depression  Here s the deal. Your body and mind are really not as separate as most people think.  What you do and think affects how you feel and how you feel influences what you do and think. This means if you do things that people who feel good do, it will help you feel better.  Sometimes this is all it takes.  There is also a place for medication and therapy depending on how severe your depression is, so be sure to consult with your medical provider and/ or Behavioral Health Consultant if your symptoms are worsening or not improving.     In order to better manage my stress, I will:    Exercise  Get some form of exercise, every day. This will help reduce pain and release endorphins, the  feel good  chemicals in your brain. This is almost as good as taking antidepressants!  This is not the same as joining a gym and then never going! (they count on that by the way ) It can be as simple as just going for a walk or doing some gardening, anything that will get you moving.      Hygiene   Maintain good hygiene (Get out of bed in the morning, Make your bed, Brush your teeth, Take a shower, and Get dressed like you were going to work, even if you are unemployed).  If your clothes don't fit try to get ones that do.    Diet  I will strive to eat foods that are good for me, drink plenty of water, and avoid excessive sugar, caffeine, alcohol, and other mood-altering substances.  Some foods that are helpful in depression are: complex carbohydrates, B vitamins, flaxseed, fish or fish oil, fresh fruits and vegetables.    Psychotherapy  I agree to participate in Individual Therapy (if recommended).    Medication  If prescribed medications, I agree to take them.  Missing doses can result in serious side effects.  I understand  that drinking alcohol, or other illicit drug use, may cause potential side effects.  I will not stop my medication abruptly without first discussing it with my provider.    Staying Connected With Others  I will stay in touch with my friends, family members, and my primary care provider/team.    Use your imagination  Be creative.  We all have a creative side; it doesn t matter if it s oil painting, sand castles, or mud pies! This will also kick up the endorphins.    Witness Beauty  (AKA stop and smell the roses) Take a look outside, even in mid-winter. Notice colors, textures. Watch the squirrels and birds.     Service to others  Be of service to others.  There is always someone else in need.  By helping others we can  get out of ourselves  and remember the really important things.  This also provides opportunities for practicing all the other parts of the program.    Humor  Laugh and be silly!  Adjust your TV habits for less news and crime-drama and more comedy.    Control your stress  Try breathing deep, massage therapy, biofeedback, and meditation. Find time to relax each day.     My support system    Clinic Contact:  Phone number:    Contact 1:  Phone number:    Contact 2:  Phone number:    [facility]/:  Phone number:    Therapist:  Phone number:    Local crisis center:    Phone number:    Other community support:  Phone number:

## 2019-10-16 NOTE — PROGRESS NOTES
SUBJECTIVE:   Fabiana Sanders is a 49 year old female who presents to clinic today for the following health issues:    Patient presents for medication refills and for follow-up on recent hospital stay.  Fabiana recently presented to the ED with acute chest pain.  Due to a mildly elevated troponin, she was transferred to [facility].  The troponin normalized.  And acute coronary syndrome seems less likely.  The patient did have an EGD while in the hospital, and had a Schatzki's ring dilated.  She was feeling better on a PPI and Carafate.  The prescription for the PPI ran out, her reflux symptoms returned.  She was given a refill by the GI department at [facility].    In the interim, she is also had a cardiac stress test which was normal.    She is due for refills of her Ultram which she uses for management of her chronic pain related to fibromyalgia.      During her hospital stay, she developed a sore underneath her nares.  This then progressed and now includes papules and redness of her upper lip chin area.  She is recently developed new lesions beneath her left eyelid and on her left forehead.      Review of Systems see HPI, review of systems is otherwise negative.    OBJECTIVE:     /74 (BP Location: Right arm, Patient Position: Sitting, Cuff Size: Adult Large)   Pulse 88   Temp 97.3  F (36.3  C) (Tympanic)   Resp 20   Wt 106.1 kg (233 lb 12.8 oz)   LMP  (LMP Unknown)   BMI 40.13 kg/m    Body mass index is 40.13 kg/m .  Physical Exam  Constitutional:       Appearance: She is well-developed.   HENT:      Right Ear: External ear normal.      Left Ear: External ear normal.   Eyes:      General: No scleral icterus.     Conjunctiva/sclera: Conjunctivae normal.   Neck:      Thyroid: No thyromegaly.   Cardiovascular:      Rate and Rhythm: Normal rate and regular rhythm.      Heart sounds: Normal heart sounds. No murmur.   Pulmonary:      Effort: Pulmonary effort is normal. No respiratory distress.     "  Breath sounds: Normal breath sounds.   Abdominal:      Palpations: Abdomen is soft.   Lymphadenopathy:      Cervical: No cervical adenopathy.   Skin:     Findings: No rash.      Comments: Maculopapular rash on chin upper lip extending to the nasolabial folds, small patch beneath her left lower eyelid, small patch on her left forehead.  Some pustular lesions.  Some honey colored areas.  No drainage.   Neurological:      Mental Status: She is alert and oriented to person, place, and time.         ASSESSMENT/PLAN:         ICD-10-CM    1. Impetigo L01.00 cephALEXin (KEFLEX) 500 MG capsule   2. Fibromyalgia M79.7 traZODone (DESYREL) 50 MG tablet     traMADol (ULTRAM) 50 MG tablet   3. Special screening for malignant neoplasms, colon Z12.11 GASTROENTEROLOGY ADULT REF PROCEDURE ONLY None     Reviewed recent negative stress test.  Reviewed recent hospital stay and GI records, including EGD and pathology.  She will follow-up with GI at [facility] as recommended.  She will be due for a repeat EGD in 6 months, possibly needing dilation at that time.  Patient reports that she had a colonoscopy in 2007 with \"16 small polyps removed.\"  She was advised to follow-up in 10 years, but she did not have insurance at that time.  She is referred for colonoscopy locally.    Discussed differential diagnosis of dermatitis.  This could be a severe case of perioral dermatitis or seborrheic dermatitis, but I am more concerned about the possibility of impetigo, part particularly due to recent hospital stay.  Would recommend treating with oral Keflex.  May need further topical treatment depending on progression or response.  Will be in touch via MyChart with photographs for further management recommendations.    The database is reviewed and signed.  Prescription for tramadol refilled.  #240, with 5 refills.    Information provided on gastroesophageal reflux disease and lifestyle change recommendations.  Patient is motivated to adapt some of " these.  Understands long-term risks associated with PPIs and is hopeful that she can get off of them.    Radha Humphrey MD  [facility] AND [facility]

## 2019-10-16 NOTE — H&P (VIEW-ONLY)
SUBJECTIVE:   Fabiana Sanders is a 49 year old female who presents to clinic today for the following health issues:    Patient presents for medication refills and for follow-up on recent hospital stay.  Fabiana recently presented to the ED with acute chest pain.  Due to a mildly elevated troponin, she was transferred to [facility].  The troponin normalized.  And acute coronary syndrome seems less likely.  The patient did have an EGD while in the hospital, and had a Schatzki's ring dilated.  She was feeling better on a PPI and Carafate.  The prescription for the PPI ran out, her reflux symptoms returned.  She was given a refill by the GI department at [facility].    In the interim, she is also had a cardiac stress test which was normal.    She is due for refills of her Ultram which she uses for management of her chronic pain related to fibromyalgia.      During her hospital stay, she developed a sore underneath her nares.  This then progressed and now includes papules and redness of her upper lip chin area.  She is recently developed new lesions beneath her left eyelid and on her left forehead.      Review of Systems see HPI, review of systems is otherwise negative.    OBJECTIVE:     /74 (BP Location: Right arm, Patient Position: Sitting, Cuff Size: Adult Large)   Pulse 88   Temp 97.3  F (36.3  C) (Tympanic)   Resp 20   Wt 106.1 kg (233 lb 12.8 oz)   LMP  (LMP Unknown)   BMI 40.13 kg/m    Body mass index is 40.13 kg/m .  Physical Exam  Constitutional:       Appearance: She is well-developed.   HENT:      Right Ear: External ear normal.      Left Ear: External ear normal.   Eyes:      General: No scleral icterus.     Conjunctiva/sclera: Conjunctivae normal.   Neck:      Thyroid: No thyromegaly.   Cardiovascular:      Rate and Rhythm: Normal rate and regular rhythm.      Heart sounds: Normal heart sounds. No murmur.   Pulmonary:      Effort: Pulmonary effort is normal. No respiratory distress.     "  Breath sounds: Normal breath sounds.   Abdominal:      Palpations: Abdomen is soft.   Lymphadenopathy:      Cervical: No cervical adenopathy.   Skin:     Findings: No rash.      Comments: Maculopapular rash on chin upper lip extending to the nasolabial folds, small patch beneath her left lower eyelid, small patch on her left forehead.  Some pustular lesions.  Some honey colored areas.  No drainage.   Neurological:      Mental Status: She is alert and oriented to person, place, and time.         ASSESSMENT/PLAN:         ICD-10-CM    1. Impetigo L01.00 cephALEXin (KEFLEX) 500 MG capsule   2. Fibromyalgia M79.7 traZODone (DESYREL) 50 MG tablet     traMADol (ULTRAM) 50 MG tablet   3. Special screening for malignant neoplasms, colon Z12.11 GASTROENTEROLOGY ADULT REF PROCEDURE ONLY None     Reviewed recent negative stress test.  Reviewed recent hospital stay and GI records, including EGD and pathology.  She will follow-up with GI at [facility] as recommended.  She will be due for a repeat EGD in 6 months, possibly needing dilation at that time.  Patient reports that she had a colonoscopy in 2007 with \"16 small polyps removed.\"  She was advised to follow-up in 10 years, but she did not have insurance at that time.  She is referred for colonoscopy locally.    Discussed differential diagnosis of dermatitis.  This could be a severe case of perioral dermatitis or seborrheic dermatitis, but I am more concerned about the possibility of impetigo, part particularly due to recent hospital stay.  Would recommend treating with oral Keflex.  May need further topical treatment depending on progression or response.  Will be in touch via MyChart with photographs for further management recommendations.    The database is reviewed and signed.  Prescription for tramadol refilled.  #240, with 5 refills.    Information provided on gastroesophageal reflux disease and lifestyle change recommendations.  Patient is motivated to adapt some of " these.  Understands long-term risks associated with PPIs and is hopeful that she can get off of them.    Radha Humphrey MD  [facility] AND [facility]

## 2019-10-16 NOTE — NURSING NOTE
"Patient here for a follow up from stress test. Heart test came back normal. They found she had inflammation of the throat and stomach. She was called by them to come back for more testing.     During the hospital stay she got a blisters under her nose. Now is has spread all around her face.   Lilli Esqueda LPN ..........10/16/2019 1:35 PM   Chief Complaint   Patient presents with     RECHECK     stress test, [facility]       Initial /74 (BP Location: Right arm, Patient Position: Sitting, Cuff Size: Adult Large)   Pulse 88   Temp 97.3  F (36.3  C) (Tympanic)   Resp 20   Wt 106.1 kg (233 lb 12.8 oz)   LMP  (LMP Unknown)   BMI 40.13 kg/m   Estimated body mass index is 40.13 kg/m  as calculated from the following:    Height as of 9/6/19: 1.626 m (5' 4\").    Weight as of this encounter: 106.1 kg (233 lb 12.8 oz).  Medication Reconciliation: complete    Lilli Esqueda LPN    "

## 2019-10-17 DIAGNOSIS — Z12.11 SPECIAL SCREENING FOR MALIGNANT NEOPLASMS, COLON: Primary | ICD-10-CM

## 2019-10-17 NOTE — TELEPHONE ENCOUNTER
Screening Questions for the Scheduling of Screening Colonoscopies   (If Colonoscopy is diagnostic, Provider should review the chart before scheduling.)  Are you younger than 50 or older than 80?  YES  Do you take aspirin or fish oil?  NO  (if yes, tell patient to stop 1 week prior to Colonoscopy)  Do you take warfarin (Coumadin), clopidogrel (Plavix), apixaban (Eliquis), dabigatram (Pradaxa), rivaroxaban (Xarelto) or any blood thinner? NO   Do you use oxygen at home?  NO   Do you have kidney disease? NO   Are you on dialysis? NO   Have you had a stroke or heart attack in the last year? NO   Have you had a stent in your heart or any blood vessel in the last year? NO  Have you had a transplant of any organ? NO   Have you had a colonoscopy or upper endoscopy (EGD) before? YES          When?  2007  Date of scheduled Colonoscopy. 11/08/2019  Provider SHARONDA PEÑA

## 2019-10-22 ENCOUNTER — TELEPHONE (OUTPATIENT)
Dept: FAMILY MEDICINE | Facility: OTHER | Age: 49
End: 2019-10-22

## 2019-10-22 RX ORDER — BISACODYL 5 MG/1
TABLET, DELAYED RELEASE ORAL
Qty: 2 TABLET | Refills: 0 | Status: SHIPPED | OUTPATIENT
Start: 2019-10-22 | End: 2020-02-19

## 2019-10-22 RX ORDER — POLYETHYLENE GLYCOL 3350, SODIUM CHLORIDE, SODIUM BICARBONATE, POTASSIUM CHLORIDE 420; 11.2; 5.72; 1.48 G/4L; G/4L; G/4L; G/4L
4000 POWDER, FOR SOLUTION ORAL ONCE
Qty: 4000 ML | Refills: 0 | Status: SHIPPED | OUTPATIENT
Start: 2019-10-22 | End: 2020-02-21

## 2019-10-22 NOTE — TELEPHONE ENCOUNTER
Jennifer from [facility] in [place] called and states this patient was admitted for observation only 09/06/19-09/08/19 for cardiac relates issues. The  Insurance is statng they need a referral from PCP before they will cover this. Please contact Jennifer at [facility] @ [phone] with questions.     Cyndie Rich on 10/22/2019 at 1:23 PM

## 2019-10-23 NOTE — TELEPHONE ENCOUNTER
Okay for referral. However, I am not sure what to enter under orders. If more is needed from me, please clarify and nuha up in orders. AET

## 2019-10-29 ENCOUNTER — MYC MEDICAL ADVICE (OUTPATIENT)
Dept: FAMILY MEDICINE | Facility: OTHER | Age: 49
End: 2019-10-29

## 2019-10-29 DIAGNOSIS — L71.0 PERIORAL DERMATITIS: Primary | ICD-10-CM

## 2019-10-30 ENCOUNTER — TELEPHONE (OUTPATIENT)
Dept: FAMILY MEDICINE | Facility: OTHER | Age: 49
End: 2019-10-30

## 2019-10-31 ENCOUNTER — TELEPHONE (OUTPATIENT)
Dept: FAMILY MEDICINE | Facility: OTHER | Age: 49
End: 2019-10-31

## 2019-10-31 DIAGNOSIS — L71.0 PERIORAL DERMATITIS: Primary | ICD-10-CM

## 2019-10-31 NOTE — TELEPHONE ENCOUNTER
Per therapeutic substitution protocol:     Medication: Noritate (metronidazole 1% cream) is not covered by insurance.      Sent new prescription for metronidazole 0.75% per notation on Rx/CPA.      Updated medication list in EPIC.    Cosmo Patino [facility]

## 2019-11-06 ENCOUNTER — MYC REFILL (OUTPATIENT)
Dept: FAMILY MEDICINE | Facility: OTHER | Age: 49
End: 2019-11-06

## 2019-11-06 DIAGNOSIS — F43.23 ADJUSTMENT DISORDER WITH MIXED ANXIETY AND DEPRESSED MOOD: ICD-10-CM

## 2019-11-06 DIAGNOSIS — E78.49 OTHER HYPERLIPIDEMIA: ICD-10-CM

## 2019-11-08 ENCOUNTER — ANESTHESIA EVENT (OUTPATIENT)
Dept: SURGERY | Facility: OTHER | Age: 49
End: 2019-11-08
Payer: COMMERCIAL

## 2019-11-08 ENCOUNTER — HOSPITAL ENCOUNTER (OUTPATIENT)
Facility: OTHER | Age: 49
Discharge: HOME | End: 2019-11-08
Attending: SURGERY | Admitting: SURGERY
Payer: COMMERCIAL

## 2019-11-08 ENCOUNTER — ANESTHESIA (OUTPATIENT)
Dept: SURGERY | Facility: OTHER | Age: 49
End: 2019-11-08
Payer: COMMERCIAL

## 2019-11-08 VITALS
HEIGHT: 63 IN | BODY MASS INDEX: 41.42 KG/M2 | SYSTOLIC BLOOD PRESSURE: 146 MMHG | RESPIRATION RATE: 16 BRPM | DIASTOLIC BLOOD PRESSURE: 91 MMHG | TEMPERATURE: 97.7 F | OXYGEN SATURATION: 99 % | HEART RATE: 92 BPM

## 2019-11-08 DIAGNOSIS — K62.1 RECTAL POLYP: ICD-10-CM

## 2019-11-08 DIAGNOSIS — K57.30 DIVERTICULOSIS OF COLON WITHOUT DIVERTICULITIS: ICD-10-CM

## 2019-11-08 DIAGNOSIS — K63.5 POLYP OF TRANSVERSE COLON, UNSPECIFIED TYPE: Primary | ICD-10-CM

## 2019-11-08 PROCEDURE — 88302 TISSUE EXAM BY PATHOLOGIST: CPT

## 2019-11-08 PROCEDURE — 88305 TISSUE EXAM BY PATHOLOGIST: CPT

## 2019-11-08 PROCEDURE — 25000125 ZZHC RX 250: Performed by: NURSE ANESTHETIST, CERTIFIED REGISTERED

## 2019-11-08 PROCEDURE — 25800030 ZZH RX IP 258 OP 636: Performed by: SURGERY

## 2019-11-08 PROCEDURE — 45385 COLONOSCOPY W/LESION REMOVAL: CPT | Mod: PT

## 2019-11-08 PROCEDURE — 40000010 ZZH STATISTIC ANES STAT CODE-CRNA PER MINUTE: Performed by: SURGERY

## 2019-11-08 PROCEDURE — 25000125 ZZHC RX 250: Performed by: SURGERY

## 2019-11-08 PROCEDURE — 25000128 H RX IP 250 OP 636: Performed by: NURSE ANESTHETIST, CERTIFIED REGISTERED

## 2019-11-08 PROCEDURE — 45385 COLONOSCOPY W/LESION REMOVAL: CPT | Mod: PT | Performed by: SURGERY

## 2019-11-08 PROCEDURE — 45380 COLONOSCOPY AND BIOPSY: CPT | Mod: PT | Performed by: SURGERY

## 2019-11-08 PROCEDURE — 45380 COLONOSCOPY AND BIOPSY: CPT | Mod: PT,XU | Performed by: SURGERY

## 2019-11-08 RX ORDER — ONDANSETRON 2 MG/ML
4 INJECTION INTRAMUSCULAR; INTRAVENOUS
Status: DISCONTINUED | OUTPATIENT
Start: 2019-11-08 | End: 2019-11-08 | Stop reason: HOSPADM

## 2019-11-08 RX ORDER — ONDANSETRON 4 MG/1
4 TABLET, ORALLY DISINTEGRATING ORAL EVERY 6 HOURS PRN
Status: CANCELLED | OUTPATIENT
Start: 2019-11-08

## 2019-11-08 RX ORDER — LIDOCAINE 40 MG/G
CREAM TOPICAL
Status: DISCONTINUED | OUTPATIENT
Start: 2019-11-08 | End: 2019-11-08 | Stop reason: HOSPADM

## 2019-11-08 RX ORDER — PROPOFOL 10 MG/ML
INJECTION, EMULSION INTRAVENOUS CONTINUOUS PRN
Status: DISCONTINUED | OUTPATIENT
Start: 2019-11-08 | End: 2019-11-08

## 2019-11-08 RX ORDER — PROPOFOL 10 MG/ML
INJECTION, EMULSION INTRAVENOUS PRN
Status: DISCONTINUED | OUTPATIENT
Start: 2019-11-08 | End: 2019-11-08

## 2019-11-08 RX ORDER — LIDOCAINE HYDROCHLORIDE 20 MG/ML
INJECTION, SOLUTION INFILTRATION; PERINEURAL PRN
Status: DISCONTINUED | OUTPATIENT
Start: 2019-11-08 | End: 2019-11-08

## 2019-11-08 RX ORDER — NALOXONE HYDROCHLORIDE 0.4 MG/ML
.1-.4 INJECTION, SOLUTION INTRAMUSCULAR; INTRAVENOUS; SUBCUTANEOUS
Status: CANCELLED | OUTPATIENT
Start: 2019-11-08 | End: 2019-11-09

## 2019-11-08 RX ORDER — VENLAFAXINE HYDROCHLORIDE 150 MG/1
CAPSULE, EXTENDED RELEASE ORAL
Qty: 90 CAPSULE | Refills: 3 | Status: SHIPPED | OUTPATIENT
Start: 2019-11-08

## 2019-11-08 RX ORDER — ONDANSETRON 2 MG/ML
4 INJECTION INTRAMUSCULAR; INTRAVENOUS EVERY 6 HOURS PRN
Status: CANCELLED | OUTPATIENT
Start: 2019-11-08

## 2019-11-08 RX ORDER — ATORVASTATIN CALCIUM 40 MG/1
40 TABLET, FILM COATED ORAL DAILY
Qty: 90 TABLET | Refills: 3 | Status: SHIPPED | OUTPATIENT
Start: 2019-11-08

## 2019-11-08 RX ORDER — FLUMAZENIL 0.1 MG/ML
0.2 INJECTION, SOLUTION INTRAVENOUS
Status: CANCELLED | OUTPATIENT
Start: 2019-11-08 | End: 2019-11-09

## 2019-11-08 RX ORDER — SODIUM CHLORIDE, SODIUM LACTATE, POTASSIUM CHLORIDE, CALCIUM CHLORIDE 600; 310; 30; 20 MG/100ML; MG/100ML; MG/100ML; MG/100ML
INJECTION, SOLUTION INTRAVENOUS CONTINUOUS
Status: DISCONTINUED | OUTPATIENT
Start: 2019-11-08 | End: 2019-11-08 | Stop reason: HOSPADM

## 2019-11-08 RX ADMIN — PROPOFOL 80 MG: 10 INJECTION, EMULSION INTRAVENOUS at 13:38

## 2019-11-08 RX ADMIN — PROPOFOL 135 MCG/KG/MIN: 10 INJECTION, EMULSION INTRAVENOUS at 13:38

## 2019-11-08 RX ADMIN — LIDOCAINE HYDROCHLORIDE 60 MG: 20 INJECTION, SOLUTION INFILTRATION; PERINEURAL at 13:38

## 2019-11-08 RX ADMIN — SODIUM CHLORIDE, POTASSIUM CHLORIDE, SODIUM LACTATE AND CALCIUM CHLORIDE: 600; 310; 30; 20 INJECTION, SOLUTION INTRAVENOUS at 13:00

## 2019-11-08 ASSESSMENT — LIFESTYLE VARIABLES: TOBACCO_USE: 1

## 2019-11-08 NOTE — DISCHARGE INSTRUCTIONS
Procedure you had done: colonoscopy with removal of polyps  Your health care provider is:  Radha Humphrey  Your surgeon is Dr. Flaquita Olmstead.   Please call your health care provider or surgeon at [phone] if:    - you feel you are getting worse or having an increase in problems    - fever greater than 101 degrees  - increasing shortness of breath or chest pain  - any signs of infection (increasing redness, swelling, tenderness, warmth, change in appearance, or  increased drainage)  - blood in your urine or stool  - coughing or vomiting blood  - nausea (upset stomach) and vomiting and/or diarrhea that will not stop  - severe pain that is not relieved by medicine, rest or ice  You have had medications for sedation. Please be aware that this can cause drowsiness and impaired judgment for up to 24 hours after your procedure. Do not drive, operate power tools or drink alcohol for 24 hours.  If samples were taken-you will get a phone call and a letter with your results in the next 7-10 days. If you don't get results, please call and let us know!     [facility]  Adult Discharge Orders & Instructions    ________________________________________________________________          For 12 hours after surgery  1. Get plenty of rest.  A responsible adult must stay with you for at least 12 hours after you leave the hospital.   2. You may feel lightheaded.  IF so, sit for a few minutes before standing.  Have someone help you get up.   3. You may have a slight fever. Call the doctor if your fever is over 101 F (38.3 C) (taken under the tongue) or lasts longer than 24 hours.  4. You may have a dry mouth, a sore throat, muscle aches or trouble sleeping.  These should go away after 24 hours.  5. Do not make important or legal decisions.  6.   Do not drive or use heavy equipment.  If you have weakness or tingling, don't drive or use heavy equipment until this feeling goes away.    To contact a doctor, call    [phone]_______________________

## 2019-11-08 NOTE — INTERVAL H&P NOTE
The history and physical has been reviewed and the patient has been examined.  There are no changes to the patient's history or physical condition.  I discussed screening colonoscopy with the patient. Anesthesia coverage requested due to chronic use of pain medications.

## 2019-11-08 NOTE — ANESTHESIA POSTPROCEDURE EVALUATION
Patient: Fabiana Sanders    Procedure(s):  COLONOSCOPY, WITH POLYPECTOMY AND BIOPSY    Diagnosis:Encounter for screening colonoscopy [Z12.11]  History of colon polyps [Z86.010]  Diagnosis Additional Information: No value filed.    Anesthesia Type:  MAC    Note:  Anesthesia Post Evaluation    Patient location during evaluation: Phase 2  Patient participation: Able to fully participate in evaluation  Level of consciousness: awake and alert  Pain management: adequate  Airway patency: patent  Cardiovascular status: acceptable  Respiratory status: acceptable  Hydration status: acceptable  PONV: none     Anesthetic complications: None          Last vitals:  Vitals:    11/08/19 1234 11/08/19 1430   BP: (!) 151/96 133/60   Pulse:  98   Resp: 18 16   Temp: 99  F (37.2  C) 97.7  F (36.5  C)   SpO2: 96% 97%         Electronically Signed By: HARI CASTILLO CRNA  November 8, 2019  2:55 PM

## 2019-11-08 NOTE — OP NOTE
PROCEDURE NOTE    SURGEON: Flaquita Clark MD.    PRE-OP DIAGNOSIS:  Screening Colonoscopy      POST-OP DIAGNOSIS: colon polyps, sigmoid diverticula     Location: transverse Size: 0.4 cm  Removed:  Y       Rectal   0.2 x 2    Y  PROCEDURE:  Colonoscopy with polypectomies cold snare, cold forceps    ESTIMATEDBLOOD LOSS: none    COMPLICATIONS:  None    SPECIMEN:  Transverse and rectal polyp    ANESTHESIA:  See anesthesia note, anesthesia requested due to chronic pain and BMI more than 40    INDICATION FOR THE PROCEDURE: The patient is a 49 year old female. The patient has no complaints. I explained to the patient the risks, benefits and alternatives to screening colonoscopy for evaluating the colon for colon polyps and colon cancer. We specifically discussed the risks of bleeding, infection, perforation, potential inability to reach the cecum and the risks of sedation. The patient's questions were answered and the patient wished to proceed. Informed consent paperwork was completed.    PROCEDURE: The patient was taken to the endoscopy suite. Appropriate monitors were attached. The patient was placed in the left lateral decubitus position.Timeout was performed confirming the patient's identity and procedure to be performed. After appropriate sedation was confirmed, digital rectal exam was performed. There was normal tone and no gross abnormality was noted. The lubricated colonoscope was introduced into the anus the colon was insufflated with air. The prep quality was adequate. Under direct visualization the scope was advanced to the cecum. The ileocecal valve was intubated and the terminal ileum inspected. No gross abnormality was noted. The scope was withdrawn back into the cecum. The mucosa of colon was inspected while withdrawing the scope. A sessile polyp was noted in the transverse colon and removed with cold snare. Two small sessile polyps were noted in the rectum and removed with cold forceps. Diverticula were  noted in the sigmoid colon. The scope was retroflexed in the rectum and the anorectal junction was inspected. No abnormalities were noted. The scope was returned to a neutral position and the colon was decompressed. The scope was removed. The patient tolerated the procedure with no immediately apparent complication. The patient was taken to recovery in stable condition.  FOLLOW UP:  RECOMMEND high fiber diet, follow up: will call with pathology results.

## 2019-11-08 NOTE — ANESTHESIA PREPROCEDURE EVALUATION
Anesthesia Pre-Procedure Evaluation    Patient: Fabiana Sanders   MRN: [id number] : 1970          Preoperative Diagnosis: Encounter for screening colonoscopy [Z12.11]  History of colon polyps [Z86.010]    Procedure(s):  COLONOSCOPY    Past Medical History:   Diagnosis Date     Bilateral carpal tunnel syndrome     10/16/2017     Depression      Family history of colon cancer      Fibromyalgia      GERD (gastroesophageal reflux disease)      Hyperlipidemia      Schatzki's ring      Past Surgical History:   Procedure Laterality Date     CARPAL TUNNEL RELEASE RT/LT Right 2017     COLONOSCOPY  2007    Multiple polyps     ESOPHAGOGASTRODUODENOSCOPY  2019    Dilation Schatzki's ring     HERNIA REPAIR, INGUINAL RT/LT Left 2007     HYSTERECTOMY  2009    has both ovaries     LAPAROSCOPIC TUBAL LIGATION             Anesthesia Evaluation     . Pt has had prior anesthetic.     No history of anesthetic complications          ROS/MED HX    ENT/Pulmonary:  - neg pulmonary ROS   (+)tobacco use, Past use , . .    Neurologic:  - neg neurologic ROS     Cardiovascular:     (+) Dyslipidemia, ----. : . . . :. .       METS/Exercise Tolerance:  >4 METS   Hematologic:  - neg hematologic  ROS       Musculoskeletal:  - neg musculoskeletal ROS       GI/Hepatic:     (+) GERD Asymptomatic on medication,       Renal/Genitourinary:  - ROS Renal section negative       Endo:  - neg endo ROS       Psychiatric:  - neg psychiatric ROS       Infectious Disease:  - neg infectious disease ROS       Malignancy:      - no malignancy   Other:    - neg other ROS                      Physical Exam  Normal systems: cardiovascular, pulmonary and dental    Airway   Mallampati: II  TM distance: >3 FB  Neck ROM: full    Dental     Cardiovascular   Rhythm and rate: regular and normal      Pulmonary    breath sounds clear to auscultation            Lab Results   Component Value Date    WBC 9.5 2019    HGB 14.4 2019    HCT 42.8 2019     " 09/06/2019     09/06/2019    POTASSIUM 3.5 09/06/2019    CHLORIDE 101 09/06/2019    CO2 28 09/06/2019    BUN 6 (L) 09/06/2019    CR 1.28 (H) 09/06/2019     (H) 09/06/2019    JANICE 9.7 09/06/2019    ALBUMIN 4.3 09/06/2019    PROTTOTAL 7.6 09/06/2019    ALT 11 09/06/2019    AST 14 09/06/2019    GGT 20 09/23/2019    ALKPHOS 133 (H) 09/23/2019    BILITOTAL 0.4 09/06/2019    LIPASE 15 09/06/2019       Preop Vitals  BP Readings from Last 3 Encounters:   11/08/19 (!) 151/96   10/16/19 132/74   09/23/19 132/88    Pulse Readings from Last 3 Encounters:   10/16/19 88   09/23/19 88   09/06/19 100      Resp Readings from Last 3 Encounters:   11/08/19 18   10/16/19 20   09/23/19 20    SpO2 Readings from Last 3 Encounters:   11/08/19 96%   09/06/19 94%   04/05/18 94%      Temp Readings from Last 1 Encounters:   11/08/19 99  F (37.2  C) (Tympanic)    Ht Readings from Last 1 Encounters:   11/08/19 1.6 m (5' 3\")      Wt Readings from Last 1 Encounters:   10/16/19 106.1 kg (233 lb 12.8 oz)    Estimated body mass index is 41.42 kg/m  as calculated from the following:    Height as of this encounter: 1.6 m (5' 3\").    Weight as of 10/16/19: 106.1 kg (233 lb 12.8 oz).       Anesthesia Plan      History & Physical Review      ASA Status:  2 .    NPO Status:  > 6 hours    Plan for MAC with Propofol induction.          Postoperative Care      Consents  Anesthetic plan, risks, benefits and alternatives discussed with:  Patient..                 HARI CASTILLO CRNA  "

## 2019-11-08 NOTE — ANESTHESIA CARE TRANSFER NOTE
Patient: Fabiana Sanders    Procedure(s):  COLONOSCOPY, WITH POLYPECTOMY AND BIOPSY    Diagnosis: Encounter for screening colonoscopy [Z12.11]  History of colon polyps [Z86.010]  Diagnosis Additional Information: No value filed.    Anesthesia Type:   MAC     Note:  Airway :Room Air  Patient transferred to:Phase II  Handoff Report: Identifed the Patient, Identified the Reponsible Provider, Reviewed the pertinent medical history, Discussed the surgical course, Reviewed Intra-OP anesthesia mangement and issues during anesthesia, Set expectations for post-procedure period and Allowed opportunity for questions and acknowledgement of understanding      Vitals: (Last set prior to Anesthesia Care Transfer)    CRNA VITALS  11/8/2019 1354 - 11/8/2019 1428      11/8/2019             Pulse:  101    Ht Rate:  101    SpO2:  97 %    Resp Rate (set):  10                Electronically Signed By: HARI CASTILLO CRNA  November 8, 2019  2:28 PM

## 2019-11-08 NOTE — TELEPHONE ENCOUNTER
Venlafaxine 150 MG 24 hr capsule & Atorvastatin 40 MG tablet  Last Office Visit: 10/16/2019  Last Lipid panel -11/09/2018  Future Office visit:       Routing refill request to provider for review/approval because:  No PHQ-9 less than 5 in past 6 months &  abnormal creatinine   Lab Test 09/06/19  0858   CR 1.28*     Unable to complete prescription refill per RNMedication Refill Policy.................... Basilia Warren RN ....................  11/8/2019   3:09 PM

## 2020-01-21 ENCOUNTER — MYC MEDICAL ADVICE (OUTPATIENT)
Dept: FAMILY MEDICINE | Facility: OTHER | Age: 50
End: 2020-01-21

## 2020-01-21 DIAGNOSIS — K22.2 SCHATZKI'S RING: Primary | ICD-10-CM

## 2020-01-21 RX ORDER — PANTOPRAZOLE SODIUM 40 MG/1
40 TABLET, DELAYED RELEASE ORAL DAILY
Qty: 90 TABLET | Refills: 3 | Status: SHIPPED | OUTPATIENT
Start: 2020-01-21

## 2020-01-31 DIAGNOSIS — M79.7 FIBROMYALGIA: ICD-10-CM

## 2020-01-31 RX ORDER — TRAZODONE HYDROCHLORIDE 50 MG/1
TABLET, FILM COATED ORAL
Qty: 90 TABLET | Refills: 3 | Status: SHIPPED | OUTPATIENT
Start: 2020-01-31 | End: 2020-07-18

## 2020-01-31 NOTE — TELEPHONE ENCOUNTER
Trazodone    Last Written Prescription Date: 10/16/2019  Last Fill Quantity: 90,   # refills: 1  Last Office Visit: 10/16/2019  Future Office visit:       Routing refill request to provider for review/approval because:  Drug-Drug: traMADol and traZODone   Serotonergic effects of this combination may be additive. The risk for serotonin syndrome/toxicity may be increased. Additionally, additive CNS depression may occur.     Drug-Drug: traZODone and venlafaxine   Serotonergic effects of trazodone and serotonin/norepinephrine reuptake inhibitors (SNRIs) may be additive. The risk of serotonin syndrome/toxicity may be increas      Unable to complete prescription refill per RNMedication Refill Policy.................... Basilia Warren RN ....................  1/31/2020   11:36 AM

## 2020-02-03 ENCOUNTER — MYC MEDICAL ADVICE (OUTPATIENT)
Dept: FAMILY MEDICINE | Facility: OTHER | Age: 50
End: 2020-02-03

## 2020-02-21 ENCOUNTER — OFFICE VISIT (OUTPATIENT)
Dept: FAMILY MEDICINE | Facility: OTHER | Age: 50
End: 2020-02-21
Attending: FAMILY MEDICINE
Payer: COMMERCIAL

## 2020-02-21 VITALS
WEIGHT: 226.2 LBS | BODY MASS INDEX: 40.07 KG/M2 | SYSTOLIC BLOOD PRESSURE: 136 MMHG | TEMPERATURE: 99 F | RESPIRATION RATE: 20 BRPM | DIASTOLIC BLOOD PRESSURE: 84 MMHG | HEART RATE: 112 BPM

## 2020-02-21 DIAGNOSIS — M25.512 BILATERAL SHOULDER PAIN, UNSPECIFIED CHRONICITY: ICD-10-CM

## 2020-02-21 DIAGNOSIS — M25.50 ARTHRALGIA, UNSPECIFIED JOINT: ICD-10-CM

## 2020-02-21 DIAGNOSIS — M79.7 FIBROMYALGIA: ICD-10-CM

## 2020-02-21 DIAGNOSIS — F43.23 ADJUSTMENT DISORDER WITH MIXED ANXIETY AND DEPRESSED MOOD: ICD-10-CM

## 2020-02-21 DIAGNOSIS — M54.2 NECK PAIN: Primary | ICD-10-CM

## 2020-02-21 DIAGNOSIS — M25.511 BILATERAL SHOULDER PAIN, UNSPECIFIED CHRONICITY: ICD-10-CM

## 2020-02-21 PROCEDURE — 99214 OFFICE O/P EST MOD 30 MIN: CPT | Performed by: FAMILY MEDICINE

## 2020-02-21 PROCEDURE — G0463 HOSPITAL OUTPT CLINIC VISIT: HCPCS

## 2020-02-21 RX ORDER — TRAMADOL HYDROCHLORIDE 50 MG/1
TABLET ORAL
Qty: 240 TABLET | Refills: 5 | Status: SHIPPED | OUTPATIENT
Start: 2020-02-21 | End: 2020-08-13

## 2020-02-21 RX ORDER — CYCLOBENZAPRINE HCL 10 MG
TABLET ORAL
Qty: 90 TABLET | Refills: 11 | Status: SHIPPED | OUTPATIENT
Start: 2020-02-21

## 2020-02-21 RX ORDER — HYDROCODONE BITARTRATE AND ACETAMINOPHEN 5; 325 MG/1; MG/1
1 TABLET ORAL EVERY 6 HOURS PRN
Qty: 30 TABLET | Refills: 0 | Status: SHIPPED | OUTPATIENT
Start: 2020-02-21 | End: 2020-03-25

## 2020-02-21 RX ORDER — VENLAFAXINE HYDROCHLORIDE 75 MG/1
75 CAPSULE, EXTENDED RELEASE ORAL DAILY
Qty: 90 CAPSULE | Refills: 1 | Status: SHIPPED | OUTPATIENT
Start: 2020-02-21 | End: 2020-08-13

## 2020-02-21 ASSESSMENT — PATIENT HEALTH QUESTIONNAIRE - PHQ9: SUM OF ALL RESPONSES TO PHQ QUESTIONS 1-9: 21

## 2020-02-21 ASSESSMENT — PAIN SCALES - GENERAL: PAINLEVEL: MODERATE PAIN (4)

## 2020-02-21 NOTE — NURSING NOTE
"Patient here for medication management, weight loss, and depression.   Lilli Esqueda LPN ..........2/21/2020 2:07 PM   Chief Complaint   Patient presents with     Recheck Medication     refill, weight loss, depression       Initial /84 (BP Location: Right arm, Patient Position: Sitting, Cuff Size: Adult Large)   Pulse 112   Temp 99  F (37.2  C) (Tympanic)   Resp 20   Wt 102.6 kg (226 lb 3.2 oz)   LMP  (LMP Unknown)   BMI 40.07 kg/m   Estimated body mass index is 40.07 kg/m  as calculated from the following:    Height as of 11/8/19: 1.6 m (5' 3\").    Weight as of this encounter: 102.6 kg (226 lb 3.2 oz).  Medication Reconciliation: complete    Lilli Esqueda LPN    "

## 2020-02-21 NOTE — PROGRESS NOTES
SUBJECTIVE:   Fabiana Sanders is a 49 year old female who presents to clinic today for the following health issues:    Patient comes in to discuss worsening depression and increased pain.  Patient has a longstanding history of back and knee pain.  Recently has developed neck and bilateral shoulder pain.  This pain can be quite severe at times, making it difficult for her to sleep.  Pain seems to have gotten worse in the last 2 months.  This also corresponds with worsening depression.    Patient states depression has been much worse since [facility].  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  She also has an 8-year-old grandson and 3-year-old granddaughter living with her, both with special emotional needs.  Her daughter (their mom) recently left to be with a wojciech she met on the Internet, she moved to [place].  The children work with a mental health specialist.    Mood was progressively worsening, about a month ago she started thinking more about suicide.  She recalls being in the hospital with her sister-in-law after she attempted suicide.  The nurse at that time told her that if somebody wanted to kill themselves, they could just buy a bottle of potassium.  [facility] and [facility] bottles of potassium in her shopping cart.  However, they were sitting right next to some snacks she was buying for her 8-year-old grandson.  When she started thinking about what would happen to her grandkids, she realized that she would never kill herself and did put the potassium back.    He feels that overall her mood has improved since then.  She does not think about suicide, knows that she would never do this.  But does feel that her pain is likely contributing to her mood.          Patient Active Problem List    Diagnosis Date Noted     Teto aguirre      Priority: Medium     Adjustment disorder with mixed anxiety and depressed mood 04/11/2018     Priority: Medium     Chronic midline low  back pain without sciatica 11/27/2017     Priority: Medium     Fibromyalgia 11/27/2017     Priority: Medium     S/P carpal tunnel release 11/08/2017     Priority: Medium     Other hyperlipidemia 10/23/2017     Priority: Medium     Bilateral carpal tunnel syndrome 10/16/2017     Priority: Medium     Depression 10/06/2017     Priority: Medium     Arthritis 09/28/2017     Priority: Medium         Review of Systems 15 system ROS completed and negative other than: See HPI.      OBJECTIVE:     /84 (BP Location: Right arm, Patient Position: Sitting, Cuff Size: Adult Large)   Pulse 112   Temp 99  F (37.2  C) (Tympanic)   Resp 20   Wt 102.6 kg (226 lb 3.2 oz)   LMP  (LMP Unknown)   BMI 40.07 kg/m    Body mass index is 40.07 kg/m .  Physical Exam  Constitutional:       Appearance: She is well-developed.   HENT:      Right Ear: External ear normal.      Left Ear: External ear normal.   Eyes:      General: No scleral icterus.     Conjunctiva/sclera: Conjunctivae normal.   Cardiovascular:      Rate and Rhythm: Normal rate.   Pulmonary:      Effort: Pulmonary effort is normal. No respiratory distress.   Skin:     Findings: No rash.   Neurological:      Mental Status: She is alert.           PHQ 2/21/2020   PHQ-9 Total Score 21   Q9: Thoughts of better off dead/self-harm past 2 weeks Several days         ASSESSMENT/PLAN:           ICD-10-CM    1. Neck pain M54.2 XR Cervical Spine 2/3 Views     CHIROPRACTIC REFERRAL     PHYSICAL THERAPY REFERRAL     HYDROcodone-acetaminophen 5-325 MG PO tablet   2. Fibromyalgia M79.7 cyclobenzaprine 10 MG PO tablet     traMADol 50 MG PO tablet   3. Bilateral shoulder pain, unspecified chronicity M25.511 CHIROPRACTIC REFERRAL    M25.512 PHYSICAL THERAPY REFERRAL     HYDROcodone-acetaminophen 5-325 MG PO tablet   4. Arthralgia, unspecified joint M25.50 Anti Nuclear Erica IgG by IFA with Reflex     Rheumatoid factor     HYDROcodone-acetaminophen 5-325 MG PO tablet   5. Adjustment disorder  with mixed anxiety and depressed mood F43.23 venlafaxine 75 MG PO 24 hr capsule     Patient is referred to chiropractor and physical therapy for acute management of her neck and shoulder pain.  Would recommend rheumatology work-up for diffuse joint issues.  Labs last done for this about 15 years ago when patient was going to the arthritis clinic in [place].  Patient will continue with tramadol.  Small amount of Lortab provided for use at night.  She did recently get a [employer] membership, plans to do water-based exercising.  We will increase Effexor to 225 mg daily.  Patient is willing to see a therapist, names provided.  Follow-up in 6 weeks.    Radha Humphrey MD  [facility]

## 2020-02-24 ENCOUNTER — HOSPITAL ENCOUNTER (OUTPATIENT)
Dept: GENERAL RADIOLOGY | Facility: OTHER | Age: 50
Discharge: HOME | End: 2020-02-24
Attending: FAMILY MEDICINE | Admitting: FAMILY MEDICINE
Payer: COMMERCIAL

## 2020-02-24 DIAGNOSIS — M54.2 NECK PAIN: ICD-10-CM

## 2020-02-24 PROCEDURE — 72040 X-RAY EXAM NECK SPINE 2-3 VW: CPT

## 2020-03-11 ENCOUNTER — HEALTH MAINTENANCE LETTER (OUTPATIENT)
Age: 50
End: 2020-03-11

## 2020-03-25 DIAGNOSIS — M25.512 BILATERAL SHOULDER PAIN, UNSPECIFIED CHRONICITY: ICD-10-CM

## 2020-03-25 DIAGNOSIS — M25.50 ARTHRALGIA, UNSPECIFIED JOINT: ICD-10-CM

## 2020-03-25 DIAGNOSIS — M54.2 NECK PAIN: ICD-10-CM

## 2020-03-25 DIAGNOSIS — M25.511 BILATERAL SHOULDER PAIN, UNSPECIFIED CHRONICITY: ICD-10-CM

## 2020-03-25 RX ORDER — HYDROCODONE BITARTRATE AND ACETAMINOPHEN 5; 325 MG/1; MG/1
1 TABLET ORAL EVERY 6 HOURS PRN
Qty: 30 TABLET | Refills: 0 | Status: SHIPPED | OUTPATIENT
Start: 2020-03-25 | End: 2020-08-06

## 2020-03-25 NOTE — TELEPHONE ENCOUNTER
[facility] sent Rx request for the following:    HYDROcodone-acetaminophen (NORCO) 5-325 MG tablet    Sig: Take 1 tablet by mouth every 6 hours as needed for severe pain      Last Prescription Date:   02/21/2020  Last Fill Qty/Refills:         30, R-0    Last Office Visit:              02/21/2020  Future Office visit:           04/03/2020    Routing refill request to teamlet provider for review/approval because:  Drug not on the [facility], [facility] or [facility] refill protocol or controlled substance      Emerita Up RN on 3/25/2020 at 4:04 PM

## 2020-07-18 ENCOUNTER — MYC REFILL (OUTPATIENT)
Dept: FAMILY MEDICINE | Facility: OTHER | Age: 50
End: 2020-07-18

## 2020-07-18 DIAGNOSIS — M79.7 FIBROMYALGIA: ICD-10-CM

## 2020-07-22 RX ORDER — TRAZODONE HYDROCHLORIDE 50 MG/1
TABLET, FILM COATED ORAL
Qty: 180 TABLET | Refills: 1 | Status: SHIPPED | OUTPATIENT
Start: 2020-07-22 | End: 2021-02-09

## 2020-07-22 NOTE — TELEPHONE ENCOUNTER
Routing refill request to provider for review/approval because:    LOV; 2/21/2020    Antonieta Jaffe RN on 7/22/2020 at 10:44 AM

## 2020-08-06 ENCOUNTER — HOSPITAL ENCOUNTER (OUTPATIENT)
Dept: GENERAL RADIOLOGY | Facility: OTHER | Age: 50
End: 2020-08-06
Attending: PHYSICIAN ASSISTANT
Payer: COMMERCIAL

## 2020-08-06 ENCOUNTER — OFFICE VISIT (OUTPATIENT)
Dept: FAMILY MEDICINE | Facility: OTHER | Age: 50
End: 2020-08-06
Attending: PHYSICIAN ASSISTANT
Payer: COMMERCIAL

## 2020-08-06 VITALS
OXYGEN SATURATION: 98 % | BODY MASS INDEX: 41.66 KG/M2 | HEART RATE: 120 BPM | WEIGHT: 235.2 LBS | DIASTOLIC BLOOD PRESSURE: 82 MMHG | SYSTOLIC BLOOD PRESSURE: 136 MMHG | TEMPERATURE: 97.4 F | RESPIRATION RATE: 20 BRPM

## 2020-08-06 DIAGNOSIS — M72.2 PLANTAR FASCIITIS: ICD-10-CM

## 2020-08-06 DIAGNOSIS — M77.31 CALCANEAL SPUR OF FOOT, RIGHT: ICD-10-CM

## 2020-08-06 DIAGNOSIS — E87.6 HYPOKALEMIA: ICD-10-CM

## 2020-08-06 DIAGNOSIS — S93.401A SPRAIN OF RIGHT ANKLE, UNSPECIFIED LIGAMENT, INITIAL ENCOUNTER: Primary | ICD-10-CM

## 2020-08-06 DIAGNOSIS — M25.471 PAIN AND SWELLING OF RIGHT ANKLE: ICD-10-CM

## 2020-08-06 DIAGNOSIS — M25.571 PAIN AND SWELLING OF RIGHT ANKLE: ICD-10-CM

## 2020-08-06 LAB
ANION GAP SERPL CALCULATED.3IONS-SCNC: 8 MMOL/L (ref 3–14)
BASOPHILS # BLD AUTO: 0.1 10E9/L (ref 0–0.2)
BASOPHILS NFR BLD AUTO: 1.3 %
BUN SERPL-MCNC: 8 MG/DL (ref 7–25)
CALCIUM SERPL-MCNC: 9.5 MG/DL (ref 8.6–10.3)
CHLORIDE SERPL-SCNC: 104 MMOL/L (ref 98–107)
CO2 SERPL-SCNC: 25 MMOL/L (ref 21–31)
CREAT SERPL-MCNC: 1.07 MG/DL (ref 0.6–1.2)
D DIMER PPP DDU-MCNC: <200 NG/ML D-DU (ref 0–230)
DIFFERENTIAL METHOD BLD: NORMAL
EOSINOPHIL # BLD AUTO: 0.1 10E9/L (ref 0–0.7)
EOSINOPHIL NFR BLD AUTO: 1.8 %
ERYTHROCYTE [DISTWIDTH] IN BLOOD BY AUTOMATED COUNT: 13.2 % (ref 10–15)
GFR SERPL CREATININE-BSD FRML MDRD: 55 ML/MIN/{1.73_M2}
GLUCOSE SERPL-MCNC: 119 MG/DL (ref 70–105)
HCT VFR BLD AUTO: 38 % (ref 35–47)
HGB BLD-MCNC: 12.6 G/DL (ref 11.7–15.7)
IMM GRANULOCYTES # BLD: 0 10E9/L (ref 0–0.4)
IMM GRANULOCYTES NFR BLD: 0.4 %
LYMPHOCYTES # BLD AUTO: 2.5 10E9/L (ref 0.8–5.3)
LYMPHOCYTES NFR BLD AUTO: 35.4 %
MCH RBC QN AUTO: 29 PG (ref 26.5–33)
MCHC RBC AUTO-ENTMCNC: 33.2 G/DL (ref 31.5–36.5)
MCV RBC AUTO: 87 FL (ref 78–100)
MONOCYTES # BLD AUTO: 0.4 10E9/L (ref 0–1.3)
MONOCYTES NFR BLD AUTO: 5.8 %
NEUTROPHILS # BLD AUTO: 3.9 10E9/L (ref 1.6–8.3)
NEUTROPHILS NFR BLD AUTO: 55.3 %
PLATELET # BLD AUTO: 292 10E9/L (ref 150–450)
POTASSIUM SERPL-SCNC: 3.3 MMOL/L (ref 3.5–5.1)
RBC # BLD AUTO: 4.35 10E12/L (ref 3.8–5.2)
SODIUM SERPL-SCNC: 137 MMOL/L (ref 134–144)
URATE SERPL-MCNC: 6.3 MG/DL (ref 4.4–7.6)
WBC # BLD AUTO: 7 10E9/L (ref 4–11)

## 2020-08-06 PROCEDURE — G0463 HOSPITAL OUTPT CLINIC VISIT: HCPCS | Mod: 25

## 2020-08-06 PROCEDURE — 99214 OFFICE O/P EST MOD 30 MIN: CPT | Performed by: PHYSICIAN ASSISTANT

## 2020-08-06 PROCEDURE — 85379 FIBRIN DEGRADATION QUANT: CPT | Mod: ZL | Performed by: PHYSICIAN ASSISTANT

## 2020-08-06 PROCEDURE — G0463 HOSPITAL OUTPT CLINIC VISIT: HCPCS

## 2020-08-06 PROCEDURE — 80048 BASIC METABOLIC PNL TOTAL CA: CPT | Mod: ZL | Performed by: PHYSICIAN ASSISTANT

## 2020-08-06 PROCEDURE — 73610 X-RAY EXAM OF ANKLE: CPT | Mod: RT

## 2020-08-06 PROCEDURE — 85025 COMPLETE CBC W/AUTO DIFF WBC: CPT | Mod: ZL | Performed by: PHYSICIAN ASSISTANT

## 2020-08-06 PROCEDURE — 84550 ASSAY OF BLOOD/URIC ACID: CPT | Mod: ZL | Performed by: PHYSICIAN ASSISTANT

## 2020-08-06 PROCEDURE — 36415 COLL VENOUS BLD VENIPUNCTURE: CPT | Mod: ZL | Performed by: PHYSICIAN ASSISTANT

## 2020-08-06 ASSESSMENT — PAIN SCALES - GENERAL: PAINLEVEL: SEVERE PAIN (6)

## 2020-08-06 NOTE — PATIENT INSTRUCTIONS
Pain, swelling right ankle/foot after cleaning out her garage. No known specific injury  XR ankle - no fracture, shows calcaneal bone spur. Treatment for this is symptomatic and use of a shoe insert  You are also having more pain after rest which can also be related to plantar fascitis - treatment for this is wearing a good support shoe, stretching out foot, icing foot, tylenol for discomfort 1000 mg 4 times daily with max 4000 mg/day. Can also use ibuprofen 600 mg 3 times daily or Aleve, take as directed for 3-5 days, then as needed.   Will have you wear an ankle support brace to also treat for possible strain/sprain  Rest, elevate, ice 10-20 minutes every 1-2 hours, 4-5 times daily  Return to clinic if symptoms persist or worsen    Labs: normal blood count, electrolytes, creatine, GFR - kidney function is 55 (normal is >60), uric acid not elevated (gout), d-dimer not elevated (blood clot).       Patient Education     Heel Spurs    The plantar fascia is a thick, fibrous layer of tissue that covers the bones on the bottom of your foot. It holds the foot bones in an arched position. Plantar fasciitis is a painful swelling of the plantar fascia.  A heel spur is an overgrowth of bone where the plantar fascia attaches to the heel bone. The heel spur itself usually doesn t cause pain. However, the heel spur might be a sign of plantar fasciitis which may cause your foot pain.  Plantar fasciitis can develop slowly or suddenly. It usually affects one foot at a time. Heel pain can feel sharp, like a knife sticking into the bottom of your foot. You may feel pain after exercising, long-distance jogging, stair climbing, long periods of standing, or after standing up.  Risk factors for plantar fasciitis include: arthritis, diabetes, obesity or recent weight gain, flat foot, and having high arches. Wearing high heels, loose shoes, or shoes with poor arch support adds to the risk.    Foot pain is usually worse in the morning. But  it improves with walking. By the end of the day there may be a dull aching. Treatment includes short-term rest and controlling inflammation. It may take up to 9 months before all symptoms go away. In rare cases, a steroid injection in the foot, or surgery, may be needed.  Home care    If you are overweight, lose weight to help healing.    Choose supportive shoes with good arch support and shock absorbency. Replace athletic shoes when they become worn out. Don t walk or run barefoot.    Premade or custom-fitted shoe inserts may be helpful. Inserts made of silicone seem to be the most effective. Custom-made inserts can be provided by a foot specialist, physical therapist, or orthopedist.    Premade or custom-made night splints keep the heel stretched out while you sleep. They may prevent morning pain.    Don't do activities that stress the feet: jogging, prolonged standing or walking, contact sports, etc.    First thing in the morning and before sports, stretch the bottom of your foot. Gently flex your ankle so the toes move toward your knee.    Icing may help control heel pain. Apply an ice pack to the heel for 10 to 20 minutes as a preventive. Or ice your heel after a severe flare-up of symptoms. You may repeat this every 1 to 2 hours as needed.    You may use over-the-counter pain medicine to control pain, unless another medicine was prescribed. Anti-inflammatory pain medicines, such as ibuprofen or naproxen, may work better than acetaminophen. If you have chronic liver or kidney disease or ever had a stomach ulcer or gastrointestinal bleeding, talk with your healthcare provider before using these medicines.    Shoe inserts, a night splint, or a special boot may be needed. Use these as directed by your healthcare provider.  Follow-up care   Follow up with your healthcare provider, physical therapist, or foot specialist as advised.  When to seek medical advice  Call your healthcare provider right away if any of  these occur:    The pain worsens.    There is no relief after a few weeks of home treatment.  Date Last Reviewed: 5/1/2018 2000-2019 [employer]. [address] [place] [address], [place] [address]. All rights reserved. This information is not intended as a substitute for professional medical care. Always follow your healthcare professional's instructions.           Patient Education     Plantar Fasciitis  Plantar fasciitis is a painful swelling of the plantar fascia. The plantar fascia is a thick, fibrous layer of tissue that covers the bones on the bottom of your foot. It supports the foot bones in an arched position.  Plantar fasciitis can happen gradually or suddenly. It usually affects one foot at a time. Heel pain can be sharp, like a knife sticking into the bottom of your foot. You may feel pain after exercising, long-distance jogging, stair climbing, long periods of standing, or after standing up.  Risk factors include: non-active lifestyle, arthritis, diabetes, obesity or recent weight gain, flat foot, high arch. Wearing high heels, loose shoes, or shoes with poor arch support for long periods of time adds to the risk. This problem is commonly found in runners and dancers. It also found in people who stand on hard surfaces for long periods of time.  Foot pain from this condition is usually worse in the morning. But it often improves with walking. By the end of the day there may be a dull aching. Treatment requires short-term rest and controlling swelling. It may take up to 9 months before all symptoms go away. Rarely, a steroid injection into the foot, or surgery, may be needed.  Home care    If you are overweight, lose weight to help healing.    Choose supportive shoes with good arch support and shock absorbency. Replace athletic shoes when they become worn out. Don t walk or run barefoot.    Premade or custom-fitted shoe inserts may be helpful. Inserts made of silicone seem to be the most  effective. Custom-made inserts can be provided by foot specialist, physical therapist, or orthopedist.    Premade or custom-made night splints keep the heel stretched out while you sleep. They may prevent morning pain.    Limit activities that stress the feet: jogging, prolonged standing or walking, contact sports, etc.    First thing in the morning and before sports, stretch the bottom of your feet. Gently flex your ankle so the toes move toward your knee.    Icing may help control heel pain. Apply an ice pack to the heel for 10 to 20 minutes as a preventive. Or ice your heel after a severe flare-up of symptoms. You may repeat this every 1 to 2 hours as needed.    You may use over-the-counter pain medicine to control pain, unless another medicine was prescribed. Anti-inflammatory pain medicines, such as ibuprofen or naproxen, may work better than acetaminophen. If you have chronic liver or kidney disease or ever had a stomach ulcer or gastrointestinal bleeding, talk with your healthcare provider before using these medicines.  Follow-up care  Follow up with your healthcare provider, or as advised.  Call for an appointment if pain worsens or there is no relief after a few weeks of home treatment. Shoe inserts, a night splint, or a special boot may be required.  If X-rays were taken, you will be told of any new findings that may affect your care.  When to seek medical advice  Call your healthcare provider right away if any of these occur:    Foot swelling    Redness or warmth with increasing pain  Date Last Reviewed: 5/1/2018 2000-2019 [employer]. [address] [place] [address], [place] [address]. All rights reserved. This information is not intended as a substitute for professional medical care. Always follow your healthcare professional's instructions.           Patient Education     Ankle Sprain (Adult)    An ankle sprain is a stretching or tearing of the ligaments that hold the ankle joint together.  There are no broken bones.  An ankle sprain is a common injury for both children and adults. It happens when the ankle turns, twists, or rolls in an awkward way. This can be caused by a sports injury. Or it can happen from doing something as simple as stepping on an uneven surface.  Ligaments are made of tough connective tissue. Normally, ligaments stretch a certain amount and then go back to their normal place. A sprain happens when a ligament is forced to stretch more than the normal amount. A severe sprain can actually tear the ligaments. If you have a severe sprain, you may have felt or heard something like a pop when you were injured.  Ankle sprains are given a grade depending on whether they are mild, moderate, or severe:    Grade 1 sprain. A mild sprain with minor stretching and damage to the ligament.    Grade 2 sprain. A moderate sprain where the ligament is partly torn.    Grade 3 sprain. The most severe kind of sprain. The ligament is completely torn.  Most sprains take about 4 to 6 weeks to heal. A severe sprain can take several months to recover.  Your healthcare provider may order X-rays to be sure you don t have a fracture, or broken bone.  The injured area will feel sore. Swelling and pain may make it hard to walk. You may need crutches if walking is painful. Or your provider may have you use a cast boot or air splint. This will depend on the grade of ankle sprain that you have.  Home care    For a Grade 1 sprain, use RICE (rest, ice, compression, and elevation):    Rest your ankle. Don t walk on it.    Ice should be used right away to help control swelling. Place an ice pack over the injured area for 20 minutes. Do this every 3 to 6 hours for the first 24 to 48 hours. Keep using ice packs to ease pain and swelling as needed. To make an ice pack, put ice cubes in a plastic bag that seals at the top. Wrap the bag in a clean, thin towel or cloth. Never put ice or an ice pack directly on the skin. The  ice pack can be put right on the cast, bandage, or splint. As the ice melts, be careful that the cast, bandage, or splint doesn t get wet. If you have a boot, open it to apply an ice pack, unless told otherwise by your provider.    Compression devices help to control swelling. They also keep the ankle from moving and support your injured ankle. These devices include dressings, bandages, and wraps.    Elevate or raise your ankle above the level of your heart when sitting or lying down. This is very important for the first 48 hours.    Follow the RICE guidelines for a Grade 2 sprain. This type of sprain will take longer to heal. Your provider may have you wear a splint, cast, or brace to keep your ankle from moving.     If you have a Grade 3 sprain, you are at risk for long-term ankle instability. In rare cases, surgery may be needed. Your provider may have you wear a short leg cast or a walking boot for 2 to 3 weeks.    After 48 hours, it may be helpful to apply heat for 20 minutes several times a day. You can do this with a heating pad or warm compress. Or you may want to go back and forth between using ice and heat. Never apply heat directly to the skin. Always wrap the heating pad or warm compress in a clean, thin towel or cloth.    You may use over-the-counter pain medicine (NSAIDS or nonsteroidal anti-inflammatory drugs) to control pain, unless another pain medicine was prescribed. Talk with your provider before using these medicines if you have chronic liver or kidney disease, or have ever had a stomach ulcer or gastrointestinal bleeding.    Follow any rehabilitation exercises your provider gives you. These can help you be more flexible and improve your balance and coordination. This is helpful in preventing long-term ankle problems.  Prevention  To help prevent ankle sprains, it s important to have good strength, balance, and flexibility. Be sure to:    Always warm up before you exercise or do something very  active    Be careful when walking or running on uneven or cracked surfaces    Wear shoes that are in good condition and fit well    Listen to your body s signals to slow down when you are in pain or tired  Follow-up care  Any X-rays you had today don t show any broken bones, breaks, or fractures. Sometimes fractures don t show up on the first X-ray. Bruises and sprains can sometimes hurt as much as a fracture. These injuries can take time to heal completely. If your symptoms don t get better or they get worse, talk with your healthcare provider. You may need a repeat X-ray.  Follow up with your healthcare provider, or as advised. Check for any warning signs listed below.  When to seek medical advice  Call your healthcare provider right away if any of these occur:    Fever of 100.4 F (38 C) or higher, or as directed by your healthcare provider    Chills    The injury doesn t seem to be healing    The swelling comes back    The cast or splint has a bad smell    The plaster cast or splint gets wet or soft    The fiberglass cast or splint gets wet and does not dry for 24 hours    The pain or swelling increases, or redness appears    Your toes become cold, blue, numb, or tingly    The skin is discolored (looks blue, purple, or gray), has blisters, or is irritated    You re-injure your ankle  Date Last Reviewed: 5/1/2018 2000-2019 [employer]. [address] [place] [address]. All rights reserved. This information is not intended as a substitute for professional medical care. Always follow your healthcare professional's instructions.

## 2020-08-06 NOTE — PROGRESS NOTES
HPI:    Fabiana Sanders is a 49 year old female who presents to clinic today for evaluation of pain and swelling to her right ankle. Onset 1 week ago, course is gradually worsening.   Mechanism of injury: no known injury/trauma. She cleaned her garage out a week ago and was standing on cement a long time. She believes this could have contributed to her symptoms.   Associated symptoms: swelling over ankle and foot. Pain over ankle and insertion of achilles tendon. Pain calves bilaterally. She is not able to bear weight with walking.     Location: right posterior ankle and ankle in general  Pain: ache to sharp pain, can radiate up her calf  Severity: 6/10 at rest to 8/10 with ambulation  Aggravated: bearing weight, walking  Alleviated rest, elevation  Treatments: rest, elevation, ice, tramadol, Aleve  Prior injury/fracture: none      Past Medical History:   Diagnosis Date     Bilateral carpal tunnel syndrome     10/16/2017     Depression      Family history of colon cancer      Fibromyalgia      GERD (gastroesophageal reflux disease)      Hyperlipidemia      Schatzki's ring        Past Surgical History:   Procedure Laterality Date     CARPAL TUNNEL RELEASE RT/LT Right 2017     COLONOSCOPY  2007    Multiple polyps     COLONOSCOPY N/A 11/8/2019    hyperplastic, 5 year follow up per [facility]     ESOPHAGOGASTRODUODENOSCOPY  2019    Dilation Schatzki's ring     HERNIA REPAIR, INGUINAL RT/LT Left 2007     HYSTERECTOMY  2009    has both ovaries     LAPAROSCOPIC TUBAL LIGATION      2001       Current Outpatient Medications   Medication Sig Dispense Refill     atorvastatin (LIPITOR) 40 MG tablet Take 1 tablet (40 mg) by mouth daily 90 tablet 3     cyclobenzaprine 10 MG PO tablet TAKE 1 TABLET BY MOUTH 3 TIMES DAILY AS NEEDED 90 tablet 11     pantoprazole (PROTONIX) 40 MG EC tablet Take 1 tablet (40 mg) by mouth daily 90 tablet 3     traMADol 50 MG PO tablet TAKE 2 TABLETS BY MOUTH 4 TIMES DAILY. 240 tablet 5     traZODone  (DESYREL) 50 MG tablet TAKE 1 TO 2 TABLETS BY MOUTH AT BEDTIME 180 tablet 1     venlafaxine (EFFEXOR-XR) 150 MG 24 hr capsule Take 1 capsule (150 mg) by mouth daily 90 capsule 3     venlafaxine 75 MG PO 24 hr capsule Take 1 capsule (75 mg) by mouth daily 90 capsule 1     acetaminophen (TYLENOL) 500 MG tablet Take 1,000 mg by mouth every 6 hours as needed Max acetaminophen dose: 4000 mg in 24 hours         No Known Allergies    ROS:  General: feels well, no fever  HENT: negative  Respiratory: negative for cough or shortness of breath  Cardiac: negative  Extremities: mild swelling bilaterally. R > L  Abdomen: negative  Musculoskeletal: pain right ankle/foot/calf  Skin: no rash    EXAM:  Vitals:    08/06/20 1628   BP: 136/82   Pulse: 120   Resp: 20   Temp: 97.4  F (36.3  C)   TempSrc: Tympanic   SpO2: 98%   Weight: 106.7 kg (235 lb 3.2 oz)     General appearance: well appearing female, in no acute distress  Respiratory: clear to auscultation bilaterally  Cardiac: RRR with no murmurs  Musculoskeletal: right ankle/foot  Inspection: mild swelling over foot/toes, moderate swelling over dorsal/medial malleolus and posterior ankle over insertion of achilles tendon. Palpation: TTP medial malleolus and achilles tendon. ROM: normal  Quiroz test is negative, symmetrical right to left. Mild calf tenderness with quiroz test bilaterally, this is symmetrical R to left. No calf swelling.   Left foot/ankle: mild swelling over foot/ankle. Non tender, normal ROM.  Normal DP, normal temperature and sensation to soft touch  Dermatological: no rashes or lesions  Psychological: normal affect, alert and pleasant    Results for orders placed or performed during the hospital encounter of 08/06/20   XR Ankle Right G/E 3 Views     Status: None    Narrative    PROCEDURE:  XR ANKLE RT G/E 3 VW    HISTORY: pain/swelling medial and lateral malleolus, over achilles  tendon; Pain and swelling of right ankle; Pain and swelling of  right  ankle.    COMPARISON:  None.    TECHNIQUE:  3 views right ankle.    FINDINGS:  No fracture or dislocation is identified. The mortise joint  is congruent. The talar dome is intact. Prominent calcaneal spurring  is seen posteriorly. No foreign body is seen. Diffuse soft tissue  swelling is most pronounced over the lateral malleolus.      Impression    IMPRESSION: No acute fracture.      HIEU CIFUENTES MD   Results for orders placed or performed in visit on 08/06/20   CBC and Differential     Status: None   Result Value Ref Range    WBC 7.0 4.0 - 11.0 10e9/L    RBC Count 4.35 3.8 - 5.2 10e12/L    Hemoglobin 12.6 11.7 - 15.7 g/dL    Hematocrit 38.0 35.0 - 47.0 %    MCV 87 78 - 100 fl    MCH 29.0 26.5 - 33.0 pg    MCHC 33.2 31.5 - 36.5 g/dL    RDW 13.2 10.0 - 15.0 %    Platelet Count 292 150 - 450 10e9/L    Diff Method Automated Method     % Neutrophils 55.3 %    % Lymphocytes 35.4 %    % Monocytes 5.8 %    % Eosinophils 1.8 %    % Basophils 1.3 %    % Immature Granulocytes 0.4 %    Absolute Neutrophil 3.9 1.6 - 8.3 10e9/L    Absolute Lymphocytes 2.5 0.8 - 5.3 10e9/L    Absolute Monocytes 0.4 0.0 - 1.3 10e9/L    Absolute Eosinophils 0.1 0.0 - 0.7 10e9/L    Absolute Basophils 0.1 0.0 - 0.2 10e9/L    Abs Immature Granulocytes 0.0 0 - 0.4 10e9/L   Basic Metabolic Panel     Status: Abnormal   Result Value Ref Range    Sodium 137 134 - 144 mmol/L    Potassium 3.3 (L) 3.5 - 5.1 mmol/L    Chloride 104 98 - 107 mmol/L    Carbon Dioxide 25 21 - 31 mmol/L    Anion Gap 8 3 - 14 mmol/L    Glucose 119 (H) 70 - 105 mg/dL    Urea Nitrogen 8 7 - 25 mg/dL    Creatinine 1.07 0.60 - 1.20 mg/dL    GFR Estimate 55 (L) >60 mL/min/[1.73_m2]    GFR Estimate If Black 66 >60 mL/min/[1.73_m2]    Calcium 9.5 8.6 - 10.3 mg/dL   Uric acid     Status: None   Result Value Ref Range    Uric Acid 6.3 4.4 - 7.6 mg/dL   D-Dimer GH     Status: None   Result Value Ref Range    D-Dimer ng/mL <200 0 - 230 ng/ml D-DU           ASSESSMENT AND  PLAN:     Diagnosis Comments   1. Sprain of right ankle, unspecified ligament, initial encounter  Crutches Order, Ankle/Foot Bracing Supplies Order    2. Plantar fasciitis  Symptomatic treatments   3. Calcaneal spur of foot, right  Symptomatic treatments, OTC shoe insert   4. Hypokalemia  Mild - increase high potassium foods in diet   5. Pain and swelling of right ankle  CBC and Differential, Basic Metabolic Panel, Uric acid, D-Dimer GH,      Pain, swelling right ankle/foot after cleaning out her garage. No known specific injury  Discussed consideration of DVT, gout, ankle/foot sprain/strain/stress fracture, arthritis  XR ankle - no fracture, shows calcaneal bone spur. Treatment for this is symptomatic and use of a shoe insert  You are also having more pain after rest which can also be related to plantar fascitis - treatment for this is wearing a good support shoe, stretching out foot, icing foot, tylenol for discomfort 1000 mg 4 times daily with max 4000 mg/day. Can also use ibuprofen 600 mg 3 times daily or Aleve, take as directed for 3-5 days, then as needed.     Will have you wear an ankle support brace to also treat for possible strain/sprain  Rest, elevate, ice 10-20 minutes every 1-2 hours, 4-5 times daily  Return to clinic if symptoms persist or worsen    Labs reviewed with patient: normal blood count, normal creatine, GFR - kidney function is 55 (normal is >60), uric acid not elevated (gout), d-dimer not elevated (blood clot). Mild hypokalemia. Discussed option to start supplement or increase foods high in potassium. Patient will increase intake of high potassium foods.         DME (Durable Medical Equipment) Orders and Documentation  Orders Placed This Encounter   Procedures     Crutches Order     Ankle/Foot Bracing Supplies Order      The patient was assessed and it was determined the patient is in need of the following listed DME Supplies/Equipment. Please complete supporting documentation below to  demonstrate medical necessity.      Ankle/Foot Bracing Supplies Documentation  Patient requires the use of the ordered bracing device due to following medical need/condition: Ankle sprain, plantar fascitis    DME All Other Item(s) Documentation    List reason for need and supporting documentation for medical necessity below for each DME item.     1. Ankle/foot sprain/strain    Hui Arnold PA-C

## 2020-08-06 NOTE — NURSING NOTE
"Chief Complaint   Patient presents with     Musculoskeletal Problem     right ankle     Patient is here for pain and swelling in her right ankle that started about a week ago. Patient states she has tried ice, elevation, tramadol and aleve all with no relief.     Initial /82   Pulse 120   Temp 97.4  F (36.3  C) (Tympanic)   Resp 20   Wt 106.7 kg (235 lb 3.2 oz)   LMP  (LMP Unknown)   SpO2 98%   BMI 41.66 kg/m   Estimated body mass index is 41.66 kg/m  as calculated from the following:    Height as of 11/8/19: 1.6 m (5' 3\").    Weight as of this encounter: 106.7 kg (235 lb 3.2 oz).  Medication Reconciliation: complete    Flaquita Gomez LPN  "

## 2020-08-13 ENCOUNTER — MYC REFILL (OUTPATIENT)
Dept: FAMILY MEDICINE | Facility: OTHER | Age: 50
End: 2020-08-13

## 2020-08-13 DIAGNOSIS — F43.23 ADJUSTMENT DISORDER WITH MIXED ANXIETY AND DEPRESSED MOOD: ICD-10-CM

## 2020-08-13 DIAGNOSIS — M79.7 FIBROMYALGIA: ICD-10-CM

## 2020-08-14 RX ORDER — VENLAFAXINE HYDROCHLORIDE 75 MG/1
75 CAPSULE, EXTENDED RELEASE ORAL DAILY
Qty: 90 CAPSULE | Refills: 1 | Status: SHIPPED | OUTPATIENT
Start: 2020-08-14

## 2020-08-14 RX ORDER — TRAMADOL HYDROCHLORIDE 50 MG/1
TABLET ORAL
Qty: 240 TABLET | Refills: 5 | Status: SHIPPED | OUTPATIENT
Start: 2020-08-14 | End: 2021-02-10

## 2020-11-08 ENCOUNTER — MYC MEDICAL ADVICE (OUTPATIENT)
Dept: FAMILY MEDICINE | Facility: OTHER | Age: 50
End: 2020-11-08

## 2020-11-08 DIAGNOSIS — Z72.0 TOBACCO ABUSE: Primary | ICD-10-CM

## 2020-12-27 ENCOUNTER — HEALTH MAINTENANCE LETTER (OUTPATIENT)
Age: 50
End: 2020-12-27

## 2021-02-09 DIAGNOSIS — M79.7 FIBROMYALGIA: ICD-10-CM

## 2021-02-09 RX ORDER — TRAZODONE HYDROCHLORIDE 50 MG/1
TABLET, FILM COATED ORAL
Qty: 60 TABLET | Refills: 0 | Status: SHIPPED | OUTPATIENT
Start: 2021-02-09

## 2021-02-09 NOTE — TELEPHONE ENCOUNTER
[facility] Rx request for the following:      TRAZODONE 50MG      TAB  Sig: TAKE 1 TO 2 TABLETS BY MOUTH AT BEDTIME      Last Prescription Date:   7/22/2020  Last Fill Qty/Refills:         180, R-1    Last Office Visit:              2/21/2020   Future Office visit:           none    Prescription refilled per RN Medication Refill Policy.................... Rajan Cosby RN ....................  2/9/2021   3:34 PM         traMADol HCl 50 MG Oral Tablet  Sig: TAKE 2 TABLETS BY MOUTH 4 TIMES DAILY.      Last Prescription Date:   8/14/2020  Last Fill Qty/Refills:         240, R-5        Routing refill request to provider for review/approval because:  Drug not on the [facility], [facility] or [facility] refill protocol or controlled substance    Unable to complete prescription refill per RN Medication Refill Policy.................... Rajan Cosby RN ....................  2/9/2021   3:34 PM

## 2021-02-10 RX ORDER — TRAMADOL HYDROCHLORIDE 50 MG/1
TABLET ORAL
Qty: 240 TABLET | Refills: 0 | Status: SHIPPED | OUTPATIENT
Start: 2021-02-10

## 2021-02-10 NOTE — TELEPHONE ENCOUNTER
Please find out if patient is visiting [place] or has moved. I will send in 1 month to this pharmacy. But patient needs to be seen every 6 months for tramadol refills. So needs to schedule a follow up for further refills. Radha Humphrey MD

## 2021-03-06 ENCOUNTER — HEALTH MAINTENANCE LETTER (OUTPATIENT)
Age: 51
End: 2021-03-06

## 2021-03-09 ENCOUNTER — MYC REFILL (OUTPATIENT)
Dept: FAMILY MEDICINE | Facility: OTHER | Age: 51
End: 2021-03-09

## 2021-03-09 DIAGNOSIS — M79.7 FIBROMYALGIA: ICD-10-CM

## 2021-03-09 DIAGNOSIS — Z72.0 TOBACCO ABUSE: ICD-10-CM

## 2021-03-10 RX ORDER — TRAMADOL HYDROCHLORIDE 50 MG/1
TABLET ORAL
Qty: 240 TABLET | Refills: 0 | Status: CANCELLED | OUTPATIENT
Start: 2021-03-10

## 2021-03-10 RX ORDER — TRAZODONE HYDROCHLORIDE 50 MG/1
TABLET, FILM COATED ORAL
Qty: 60 TABLET | Refills: 0 | Status: CANCELLED | OUTPATIENT
Start: 2021-03-10

## 2021-03-10 NOTE — TELEPHONE ENCOUNTER
See last refilled request, overdue for follow up.   If currently on chantix (filled last fall) and successful at quitting, then starter pack not appropriate.   Unable to do telephone/virtual visit if in another state (provider has to be licensed in the state where the patient is).   Radha Humphrey MD

## 2021-03-18 DIAGNOSIS — F43.23 ADJUSTMENT DISORDER WITH MIXED ANXIETY AND DEPRESSED MOOD: ICD-10-CM

## 2021-03-18 DIAGNOSIS — M79.7 FIBROMYALGIA: ICD-10-CM

## 2021-03-18 RX ORDER — TRAZODONE HYDROCHLORIDE 50 MG/1
TABLET, FILM COATED ORAL
Qty: 60 TABLET | Refills: 0 | Status: CANCELLED | OUTPATIENT
Start: 2021-03-18

## 2021-03-19 NOTE — TELEPHONE ENCOUNTER
Attempted to contact patient. Has been greater 12 months since last OV. Requests for refills are for a pharmacy in [place].   Marlee Blum RN ,....................  3/19/2021   3:44 PM

## 2021-03-19 NOTE — TELEPHONE ENCOUNTER
"[facility] [place]  sent Rx request for the following:       venlafaxine (EFFEXOR-XR) 75 MG 24 hr capsule  Sig Take 1 capsule by mouth once daily  Last Prescription Date:   8/14/2020  Last Fill Qty/Refills:         90, R-1    Last Office Visit:              2/21/2020  Future Office visit:           none    Routing refill request to provider for review/approval because:    Patient needs to be seen because it has been more than 1 year since last office visit.- attempted phone call to schedule appointment.       Serotonin-Norepinephrine Reuptake Inhibitors  Failed - 3/18/2021  7:20 PM       Failed - PHQ-9 score of less than 5 in past 6 months    Please review last PHQ-9 score.          Failed - Recent (6 mo) or future (30 days) visit within the authorizing provider's specialty    Patient had office visit in the last 6 months or has a visit in the next 30 days with authorizing provider or within the authorizing provider's specialty.  See \"Patient Info\" tab in inbasket, or \"Choose Columns\" in Meds & Orders section of the refill encounter.             traZODone (DESYREL) 50 MG tablet  Sig tAKE 1 TO 2 TABLETS BY MOUTH AT BEDTIME  Last Prescription Date:   2/9/2021  Last Fill Qty/Refills:         60, R-0         Routing refill request to provider for review/approval because:    Patient needs to be seen because it has been more than 1 year since last office visit.  Serotonin Modulators Passed - 3/18/2021  7:20 PM       Passed - Recent (12 mo) or future (30 days) visit within the authorizing provider's specialty    Patient has had an office visit with the authorizing provider or a provider within the authorizing providers department within the previous 12 mos or has a future within next 30 days. See \"Patient Info\" tab in inbasket, or \"Choose Columns\" in Meds & Orders section of the refill encounter.             Passed - Medication is active on med list       Passed - Patient is age 18 or older       Passed - No active pregnancy " on record       Passed - No positive pregnancy test in past 12 months       traMADol (ULTRAM) 50 MG tablet  Sig TAKE 2 TABLETS BY MOUTH 4 TIMES DAILY  Last Prescription Date:   2/10/21  Last Fill Qty/Refills:         240, R-0        Routing refill request to provider for review/approval because:  Drug not on [place] [facility] [place] [facility] or [facility] refill protocol or controlled substance     There is no refill protocol information for this order        Unable to complete prescription refill per RN Medication Refill Policy.................... Marlee Blum RN ....................  3/22/2021   10:23 AM

## 2021-03-22 DIAGNOSIS — M79.7 FIBROMYALGIA: ICD-10-CM

## 2021-03-22 RX ORDER — VENLAFAXINE HYDROCHLORIDE 75 MG/1
CAPSULE, EXTENDED RELEASE ORAL
Qty: 150 CAPSULE | Refills: 0 | OUTPATIENT
Start: 2021-03-22

## 2021-03-22 RX ORDER — TRAMADOL HYDROCHLORIDE 50 MG/1
TABLET ORAL
Qty: 240 TABLET | Refills: 0 | OUTPATIENT
Start: 2021-03-22

## 2021-03-22 RX ORDER — TRAZODONE HYDROCHLORIDE 50 MG/1
TABLET, FILM COATED ORAL
Qty: 60 TABLET | Refills: 0 | OUTPATIENT
Start: 2021-03-22

## 2021-03-22 NOTE — TELEPHONE ENCOUNTER
Rx refills declined, per 3/9/2021 note.   Marlee Blum RN ,....................  3/22/2021   1:06 PM

## 2021-03-23 RX ORDER — TRAZODONE HYDROCHLORIDE 50 MG/1
TABLET, FILM COATED ORAL
Qty: 60 TABLET | Refills: 0 | OUTPATIENT
Start: 2021-03-23

## 2021-03-23 NOTE — TELEPHONE ENCOUNTER
Per 03/09/2021 encounter, decline per PCP. Needs appointment. Unable to complete prescription refill per RN Medication Refill Policy.................... Ashli Stinson RN ....................  3/23/2021   9:07 AM

## 2021-04-25 ENCOUNTER — HEALTH MAINTENANCE LETTER (OUTPATIENT)
Age: 51
End: 2021-04-25

## 2021-06-03 DIAGNOSIS — F43.23 ADJUSTMENT DISORDER WITH MIXED ANXIETY AND DEPRESSED MOOD: ICD-10-CM

## 2021-06-03 DIAGNOSIS — M79.7 FIBROMYALGIA: ICD-10-CM

## 2021-06-07 RX ORDER — TRAZODONE HYDROCHLORIDE 50 MG/1
TABLET, FILM COATED ORAL
Qty: 60 TABLET | Refills: 0 | OUTPATIENT
Start: 2021-06-07

## 2021-06-07 RX ORDER — TRAMADOL HYDROCHLORIDE 50 MG/1
TABLET ORAL
Qty: 240 TABLET | Refills: 0 | OUTPATIENT
Start: 2021-06-07

## 2021-06-07 RX ORDER — VENLAFAXINE HYDROCHLORIDE 75 MG/1
CAPSULE, EXTENDED RELEASE ORAL
Qty: 150 CAPSULE | Refills: 0 | OUTPATIENT
Start: 2021-06-07

## 2021-06-07 NOTE — TELEPHONE ENCOUNTER
[facility] [place]  sent Rx request for the following:     Requested Prescriptions   Pending Prescriptions Disp Refills     traZODone (DESYREL) 50 MG tablet [Pharmacy Med Name: traZODone HCl 50 MG Oral Tablet] 60 tablet 0     Sig: TAKE 1 TO 2 TABLETS BY MOUTH AT BEDTIME            traMADol (ULTRAM) 50 MG tablet [Pharmacy Med Name: traMADol HCl 50 MG Oral Tablet] 240 tablet 0     Sig: TAKE 2 TABLETS BY MOUTH 4 TIMES DAILY           venlafaxine (EFFEXOR-XR) 75 MG 24 hr capsule [Pharmacy Med Name: Venlafaxine HCl ER 75 MG Oral Capsule Extended Release 24 Hour] 150 capsule 0     Sig: Take 1 capsule by mouth once daily          Declined, per encounters, pt needs appointment for refills.   Unable to complete prescription refill per RN Medication Refill Policy.................... Marlee Blum RN ....................  6/7/2021   9:55 AM

## 2021-06-15 DIAGNOSIS — M79.7 FIBROMYALGIA: ICD-10-CM

## 2021-06-17 RX ORDER — TRAZODONE HYDROCHLORIDE 50 MG/1
TABLET, FILM COATED ORAL
Qty: 60 TABLET | Refills: 0 | OUTPATIENT
Start: 2021-06-17

## 2021-06-17 NOTE — TELEPHONE ENCOUNTER
[facility] [place] sent Rx [place] for the following:     Requested Prescriptions   Pending Prescriptions Disp Refills     traZODone (DESYREL) 50 MG tablet 60 tablet 0     Sig: Take 1 to 2 tablets by mouth at bedtime     Last Prescription Date:   2/10/2021  Last Fill Qty/Refills:         240, R-0    Last Office Visit:              2/10/2020  Future Office visit:           none    Declined, needs appointment per PCP for refills.   Unable to complete prescription refill per RN Medication Refill Policy.................... Marlee Blum RN ....................  6/17/2021   8:22 AM

## 2021-10-09 ENCOUNTER — HEALTH MAINTENANCE LETTER (OUTPATIENT)
Age: 51
End: 2021-10-09

## 2022-03-26 ENCOUNTER — HEALTH MAINTENANCE LETTER (OUTPATIENT)
Age: 52
End: 2022-03-26

## 2022-05-21 ENCOUNTER — HEALTH MAINTENANCE LETTER (OUTPATIENT)
Age: 52
End: 2022-05-21

## 2022-09-17 ENCOUNTER — HEALTH MAINTENANCE LETTER (OUTPATIENT)
Age: 52
End: 2022-09-17

## 2023-05-06 ENCOUNTER — HEALTH MAINTENANCE LETTER (OUTPATIENT)
Age: 53
End: 2023-05-06

## 2023-06-04 ENCOUNTER — HEALTH MAINTENANCE LETTER (OUTPATIENT)
Age: 53
End: 2023-06-04

## (undated) DEVICE — ENDO SNARE EXACTO COLD 9MM LOOP 2.4MMX230CM 00711115

## (undated) DEVICE — SUCTION MANIFOLD NEPTUNE 2 SYS 4 PORT 0702-020-000

## (undated) DEVICE — SOL WATER 1500ML

## (undated) DEVICE — ENDO FORCEP ENDOJAW BIOPSY 2.8MMX230CM FB-220U

## (undated) DEVICE — TUBING SUCTION 10'X3/16" N510

## (undated) DEVICE — ENDO BRUSH CHANNEL MASTER CLEANING 2-4.2MM BW-412T

## (undated) DEVICE — ENDO KIT COMPLIANCE DYKENDOCMPLY

## (undated) RX ORDER — NITROGLYCERIN 0.4 MG/1
TABLET SUBLINGUAL
Status: DISPENSED
Start: 2019-09-06

## (undated) RX ORDER — PROPOFOL 10 MG/ML
INJECTION, EMULSION INTRAVENOUS
Status: DISPENSED
Start: 2019-11-08

## (undated) RX ORDER — LIDOCAINE HYDROCHLORIDE 20 MG/ML
SOLUTION OROPHARYNGEAL
Status: DISPENSED
Start: 2019-09-06

## (undated) RX ORDER — MORPHINE SULFATE 2 MG/ML
INJECTION, SOLUTION INTRAMUSCULAR; INTRAVENOUS
Status: DISPENSED
Start: 2019-09-06

## (undated) RX ORDER — METOPROLOL TARTRATE 1 MG/ML
INJECTION, SOLUTION INTRAVENOUS
Status: DISPENSED
Start: 2019-09-06

## (undated) RX ORDER — HEPARIN SODIUM 5000 [USP'U]/.5ML
INJECTION, SOLUTION INTRAVENOUS; SUBCUTANEOUS
Status: DISPENSED
Start: 2019-09-06

## (undated) RX ORDER — ALUMINA, MAGNESIA, AND SIMETHICONE 2400; 2400; 240 MG/30ML; MG/30ML; MG/30ML
SUSPENSION ORAL
Status: DISPENSED
Start: 2019-09-06

## (undated) RX ORDER — ASPIRIN 81 MG/1
TABLET, CHEWABLE ORAL
Status: DISPENSED
Start: 2019-09-06

## (undated) RX ORDER — SODIUM CHLORIDE 9 MG/ML
INJECTION, SOLUTION INTRAVENOUS
Status: DISPENSED
Start: 2019-09-06

## (undated) RX ORDER — ONDANSETRON 2 MG/ML
INJECTION INTRAMUSCULAR; INTRAVENOUS
Status: DISPENSED
Start: 2019-09-06

## (undated) RX ORDER — HEPARIN SODIUM 10000 [USP'U]/100ML
INJECTION, SOLUTION INTRAVENOUS
Status: DISPENSED
Start: 2019-09-06

## (undated) RX ORDER — LIDOCAINE HYDROCHLORIDE 20 MG/ML
INJECTION, SOLUTION EPIDURAL; INFILTRATION; INTRACAUDAL; PERINEURAL
Status: DISPENSED
Start: 2019-11-08